# Patient Record
Sex: FEMALE | Race: BLACK OR AFRICAN AMERICAN | NOT HISPANIC OR LATINO
[De-identification: names, ages, dates, MRNs, and addresses within clinical notes are randomized per-mention and may not be internally consistent; named-entity substitution may affect disease eponyms.]

---

## 2021-05-26 ENCOUNTER — NON-APPOINTMENT (OUTPATIENT)
Age: 36
End: 2021-05-26

## 2021-06-01 ENCOUNTER — LABORATORY RESULT (OUTPATIENT)
Age: 36
End: 2021-06-01

## 2021-06-01 ENCOUNTER — APPOINTMENT (OUTPATIENT)
Dept: FAMILY MEDICINE | Facility: CLINIC | Age: 36
End: 2021-06-01
Payer: COMMERCIAL

## 2021-06-01 ENCOUNTER — NON-APPOINTMENT (OUTPATIENT)
Age: 36
End: 2021-06-01

## 2021-06-01 VITALS — HEART RATE: 80 BPM | SYSTOLIC BLOOD PRESSURE: 118 MMHG | DIASTOLIC BLOOD PRESSURE: 76 MMHG

## 2021-06-01 VITALS
OXYGEN SATURATION: 100 % | TEMPERATURE: 98.4 F | HEART RATE: 104 BPM | HEIGHT: 62 IN | SYSTOLIC BLOOD PRESSURE: 131 MMHG | DIASTOLIC BLOOD PRESSURE: 87 MMHG

## 2021-06-01 DIAGNOSIS — Z00.00 ENCOUNTER FOR GENERAL ADULT MEDICAL EXAMINATION W/OUT ABNORMAL FINDINGS: ICD-10-CM

## 2021-06-01 DIAGNOSIS — Z82.49 FAMILY HISTORY OF ISCHEMIC HEART DISEASE AND OTHER DISEASES OF THE CIRCULATORY SYSTEM: ICD-10-CM

## 2021-06-01 DIAGNOSIS — Z78.9 OTHER SPECIFIED HEALTH STATUS: ICD-10-CM

## 2021-06-01 DIAGNOSIS — Z82.3 FAMILY HISTORY OF STROKE: ICD-10-CM

## 2021-06-01 DIAGNOSIS — Z83.3 FAMILY HISTORY OF DIABETES MELLITUS: ICD-10-CM

## 2021-06-01 DIAGNOSIS — Z82.5 FAMILY HISTORY OF ASTHMA AND OTHER CHRONIC LOWER RESPIRATORY DISEASES: ICD-10-CM

## 2021-06-01 DIAGNOSIS — Z23 ENCOUNTER FOR IMMUNIZATION: ICD-10-CM

## 2021-06-01 DIAGNOSIS — Z87.09 PERSONAL HISTORY OF OTHER DISEASES OF THE RESPIRATORY SYSTEM: ICD-10-CM

## 2021-06-01 PROCEDURE — G0442 ANNUAL ALCOHOL SCREEN 15 MIN: CPT | Mod: NC,59

## 2021-06-01 PROCEDURE — 99072 ADDL SUPL MATRL&STAF TM PHE: CPT

## 2021-06-01 PROCEDURE — 36415 COLL VENOUS BLD VENIPUNCTURE: CPT

## 2021-06-01 PROCEDURE — 90471 IMMUNIZATION ADMIN: CPT

## 2021-06-01 PROCEDURE — 99385 PREV VISIT NEW AGE 18-39: CPT | Mod: 25

## 2021-06-01 PROCEDURE — 90707 MMR VACCINE SC: CPT

## 2021-06-01 NOTE — HEALTH RISK ASSESSMENT
[Good] : ~his/her~  mood as  good [] : No [Yes] : Yes [2 - 4 times a month (2 pts)] : 2-4 times a month (2 points) [1 or 2 (0 pts)] : 1 or 2 (0 points) [Never (0 pts)] : Never (0 points) [No] : In the past 12 months have you used drugs other than those required for medical reasons? No [0] : 2) Feeling down, depressed, or hopeless: Not at all (0) [Audit-CScore] : 2 [de-identified] : fruits, veggies  [de-identified] : 20 min/ day, daily  [[id number]] : 0 [Patient reported PAP Smear was normal] : Patient reported PAP Smear was normal [HIV test declined] : HIV test declined [Hepatitis C test declined] : Hepatitis C test declined [Change in mental status noted] : No change in mental status noted [Language] : denies difficulty with language [None] : None [With Family] : lives with family [Employed] : employed [Single] : single [Sexually Active] : not sexually active [High Risk Behavior] : no high risk behavior [Feels Safe at Home] : Feels safe at home [Fully functional (bathing, dressing, toileting, transferring, walking, feeding)] : Fully functional (bathing, dressing, toileting, transferring, walking, feeding) [Fully functional (using the telephone, shopping, preparing meals, housekeeping, doing laundry, using] : Fully functional and needs no help or supervision to perform IADLs (using the telephone, shopping, preparing meals, housekeeping, doing laundry, using transportation, managing medications and managing finances) [Reports changes in hearing] : Reports no changes in hearing [Reports changes in vision] : Reports no changes in vision [PapSmearDate] : 04/21 [HIVDate] : 04/21 [HepatitisCDate] : 04/21 [FreeTextEntry2] : GRANADOS

## 2021-06-01 NOTE — HISTORY OF PRESENT ILLNESS
[FreeTextEntry1] : establish care  [de-identified] : 35 yo f presents to establish care. \par Here for a physical, no complaints today, last physical was a few years ago, all was normal. \par

## 2021-06-02 ENCOUNTER — TRANSCRIPTION ENCOUNTER (OUTPATIENT)
Age: 36
End: 2021-06-02

## 2021-06-02 LAB
25(OH)D3 SERPL-MCNC: 33.6 NG/ML
ALBUMIN SERPL ELPH-MCNC: 4.2 G/DL
ALP BLD-CCNC: 41 U/L
ALT SERPL-CCNC: 6 U/L
ANION GAP SERPL CALC-SCNC: 12 MMOL/L
AST SERPL-CCNC: 16 U/L
BASOPHILS # BLD AUTO: 0.06 K/UL
BASOPHILS NFR BLD AUTO: 1.2 %
BILIRUB SERPL-MCNC: 0.5 MG/DL
BUN SERPL-MCNC: 9 MG/DL
CALCIUM SERPL-MCNC: 9.4 MG/DL
CHLORIDE SERPL-SCNC: 107 MMOL/L
CHOLEST SERPL-MCNC: 172 MG/DL
CO2 SERPL-SCNC: 20 MMOL/L
CREAT SERPL-MCNC: 0.66 MG/DL
EOSINOPHIL # BLD AUTO: 0.11 K/UL
EOSINOPHIL NFR BLD AUTO: 2.1 %
ESTIMATED AVERAGE GLUCOSE: 97 MG/DL
GLUCOSE SERPL-MCNC: 78 MG/DL
HBA1C MFR BLD HPLC: 5 %
HCT VFR BLD CALC: 27.1 %
HDLC SERPL-MCNC: 61 MG/DL
HGB BLD-MCNC: 6.5 G/DL
IMM GRANULOCYTES NFR BLD AUTO: 0.4 %
LDLC SERPL CALC-MCNC: 98 MG/DL
LYMPHOCYTES # BLD AUTO: 1.16 K/UL
LYMPHOCYTES NFR BLD AUTO: 22.3 %
MAN DIFF?: NORMAL
MCHC RBC-ENTMCNC: 16.3 PG
MCHC RBC-ENTMCNC: 24 GM/DL
MCV RBC AUTO: 68.1 FL
MONOCYTES # BLD AUTO: 0.33 K/UL
MONOCYTES NFR BLD AUTO: 6.3 %
NEUTROPHILS # BLD AUTO: 3.53 K/UL
NEUTROPHILS NFR BLD AUTO: 67.7 %
NONHDLC SERPL-MCNC: 110 MG/DL
PLATELET # BLD AUTO: 461 K/UL
POTASSIUM SERPL-SCNC: 4.7 MMOL/L
PROT SERPL-MCNC: 7.2 G/DL
RBC # BLD: 3.98 M/UL
RBC # FLD: 21.2 %
SODIUM SERPL-SCNC: 139 MMOL/L
TRIGL SERPL-MCNC: 61 MG/DL
TSH SERPL-ACNC: 1.48 UIU/ML
WBC # FLD AUTO: 5.21 K/UL

## 2021-06-03 ENCOUNTER — INPATIENT (INPATIENT)
Facility: HOSPITAL | Age: 36
LOS: 0 days | Discharge: HOME | DRG: 812 | End: 2021-06-04
Attending: STUDENT IN AN ORGANIZED HEALTH CARE EDUCATION/TRAINING PROGRAM | Admitting: STUDENT IN AN ORGANIZED HEALTH CARE EDUCATION/TRAINING PROGRAM
Payer: COMMERCIAL

## 2021-06-03 VITALS
OXYGEN SATURATION: 100 % | RESPIRATION RATE: 18 BRPM | SYSTOLIC BLOOD PRESSURE: 155 MMHG | DIASTOLIC BLOOD PRESSURE: 94 MMHG | HEART RATE: 111 BPM | HEIGHT: 62 IN | WEIGHT: 179.9 LBS | TEMPERATURE: 98 F

## 2021-06-03 DIAGNOSIS — R63.8 OTHER SYMPTOMS AND SIGNS CONCERNING FOOD AND FLUID INTAKE: ICD-10-CM

## 2021-06-03 DIAGNOSIS — D64.9 ANEMIA, UNSPECIFIED: ICD-10-CM

## 2021-06-03 DIAGNOSIS — R00.2 PALPITATIONS: ICD-10-CM

## 2021-06-03 DIAGNOSIS — D21.9 BENIGN NEOPLASM OF CONNECTIVE AND OTHER SOFT TISSUE, UNSPECIFIED: ICD-10-CM

## 2021-06-03 DIAGNOSIS — M75.100 UNSPECIFIED ROTATOR CUFF TEAR OR RUPTURE OF UNSPECIFIED SHOULDER, NOT SPECIFIED AS TRAUMATIC: ICD-10-CM

## 2021-06-03 LAB — HCG SERPL-ACNC: <0 MIU/ML — SIGNIFICANT CHANGE UP

## 2021-06-03 PROCEDURE — 71045 X-RAY EXAM CHEST 1 VIEW: CPT | Mod: 26

## 2021-06-03 PROCEDURE — 93010 ELECTROCARDIOGRAM REPORT: CPT

## 2021-06-03 PROCEDURE — 99285 EMERGENCY DEPT VISIT HI MDM: CPT | Mod: 25

## 2021-06-03 PROCEDURE — 99223 1ST HOSP IP/OBS HIGH 75: CPT | Mod: GC

## 2021-06-03 NOTE — ED PROVIDER NOTE - OBJECTIVE STATEMENT
35 y/o F pt with PMHx of fibroids presents to ED c/o lightheadedness and palpitations for the past few days. Pt went to her PMD for check up and had labs drawn; she was later told she was anemic to hemoglobin of 6.5. Pt relates having heavy periods in addition to her fibroids, but is not currently having any bleeding. She was instructed to come to ED for evaluation. Pt was found to be tachycardic in ED upon arrival, but denies active chest pain, and her other VS are stable.

## 2021-06-03 NOTE — H&P ADULT - NSHPPHYSICALEXAM_GEN_ALL_CORE
VITAL SIGNS:  T(C): 36.9 (06-03-21 @ 21:45), Max: 37.3 (06-03-21 @ 19:51)  T(F): 98.5 (06-03-21 @ 21:45), Max: 99.2 (06-03-21 @ 19:51)  HR: 91 (06-03-21 @ 21:45) (91 - 111)  BP: 128/80 (06-03-21 @ 21:45) (128/80 - 155/94)  BP(mean): --  RR: 18 (06-03-21 @ 21:45) (16 - 18)  SpO2: 100% (06-03-21 @ 21:45) (100% - 100%)  Wt(kg): --    PHYSICAL EXAM:  Constitutional: WDWN, lying comfortably in bed, NAD  Head: Nc/At  Eyes: PERRL, EOMI, +conjunctival pallor   ENT: no nasal discharge; uvula midline, no oropharyngeal erythema or exudates; MMM  Neck: supple; no JVD or thyromegaly  Respiratory: CTA b/l, no wheezes, rales, or rhonchi  Cardiac: +S1/S2, +RRR, no murmurs, rubs, or gallops  Gastrointestinal: soft, non-tender, non-distended, no rebound/guarding, no palpable masses, normoactive bowel sounds x4  Extremities: WWP, no clubbing or cyanosis, no peripheral edema  Musculoskeletal: NROM x4; no joint swelling, tenderness or erythema  Vascular: 2+ radial and DP pulses b/l  Dermatologic: skin warm, dry and intact, no rashes, wounds, or scars, +pallor in nail beds   Lymphatic: no submandibular or cervical LAD  Neurologic: AAOx3, CNII-XII grossly intact, no focal deficits  Psychiatric: affect and characteristics of appearance, verbalizations, behaviors are appropriate

## 2021-06-03 NOTE — H&P ADULT - PROBLEM SELECTOR PLAN 1
#Microcytic symptomatic anemia:   Presenting with weakness, GRANADOS, and palpitations after being found to have a Hb of 6.5 at a routine PCP visit. Hb 6.3 in the ED with MCV of 62. Patient reports significant blood loss during menstrual cycles and is concerned about the fact that she is currently anemic with her menstrual cycle approaching (last cycle was 05/05-05/15). Denies melena, hematochezia, or hematemesis.   - consented and ordered for 2U pRBCS   - f/u iron studies in AM   - maintain active type and screen and transfuse if Hb<7  - consider Venofer 200 mg IV x1 as likely CHARLIE   - discharge on ferrous sulfate PO daily   - out-pt follow-up with PCP (Dr. Lagos) and OBGYN (Dr. Chavez)

## 2021-06-03 NOTE — H&P ADULT - PROBLEM SELECTOR PLAN 3
#Palpitations:   Likely 2/2 to tachycardia in the setting of symptomatic anemia. Complains of some palpitations the past week in the absence of CP.   - EKG showing NSR   - troponin negative

## 2021-06-03 NOTE — H&P ADULT - PROBLEM SELECTOR PLAN 2
History of uterine fibroids, for which patient has undergone at least 2 myomectomies. Most recent endometrial myomectomy was in Jan 2016. Follows with a PCP in [place] (Dr. Gilberto Chavez). LMP ended 05/15.   - continue home tramadol 50 mg PO q6 PRN for pelvic pain   - out-patient follow-up with OBGYN

## 2021-06-03 NOTE — H&P ADULT - ASSESSMENT
36F with PMH of anemia 2/2 to menorrhagia from fibroids (s/p myomectomy in Jan 2016) and rotator cuff tear, who presents from out-patient PCP for symptomatic anemia. Found to have Hb of 6.3, so admitted to receive 2U pRBCS.

## 2021-06-03 NOTE — ED PROVIDER NOTE - CLINICAL SUMMARY MEDICAL DECISION MAKING FREE TEXT BOX
35 y/o F pt presents to ED with asymptomatic anemia. Will check labs, pt crossed for 2units PRBC, and will admit.

## 2021-06-03 NOTE — H&P ADULT - NSHPLABSRESULTS_GEN_ALL_CORE
LABS:                         6.3    6.70  )-----------( 491      ( 03 Jun 2021 19:26 )             24.3     06-03    144  |  110<H>  |  10  ----------------------------<  112<H>  4.0   |  23  |  0.58    Ca    9.0      03 Jun 2021 19:26    TPro  7.8  /  Alb  4.3  /  TBili  0.5  /  DBili  x   /  AST  15  /  ALT  8<L>  /  AlkPhos  42  06-03    PT/INR - ( 03 Jun 2021 19:26 )   PT: 12.4 sec;   INR: 1.04          PTT - ( 03 Jun 2021 19:26 )  PTT:29.0 sec    CARDIAC MARKERS ( 03 Jun 2021 19:26 )  x     / <0.01 ng/mL / x     / x     / x                RADIOLOGY, EKG & ADDITIONAL TESTS: Reviewed.

## 2021-06-03 NOTE — ED ADULT TRIAGE NOTE - CHIEF COMPLAINT QUOTE
Patient states, "My hemoglobin is 6.5."  Patient complaining of CP, SOB, HA and lightheadedness.  Patient denies any dizziness, N/V/D, vaginal bleeding or any other complaints at this time.  EKG in progress.

## 2021-06-03 NOTE — ED ADULT NURSE NOTE - NSIMPLEMENTINTERV_GEN_ALL_ED
Implemented All Universal Safety Interventions:  [place] to call system. Call bell, personal items and telephone within reach. Instruct patient to call for assistance. Room bathroom lighting operational. Non-slip footwear when patient is off stretcher. Physically safe environment: no spills, clutter or unnecessary equipment. Stretcher in lowest position, wheels locked, appropriate side rails in place.

## 2021-06-03 NOTE — H&P ADULT - PROBLEM SELECTOR PLAN 5
F: none   E: keep K>4, Mg>2  N: regular diet     DVT ppx: ambulatory and held in setting of anemia   GI ppx: none   Code status: full code

## 2021-06-03 NOTE — ED ADULT NURSE NOTE - OBJECTIVE STATEMENT
Patient c/o generalized weakness, sob, dizziness x 1 week, vaginal bleeding x 1 month. Hx fibroids.  Was told her hgb is 6.5.  VSS.  Labs sent.  Continue to monitor

## 2021-06-03 NOTE — H&P ADULT - HISTORY OF PRESENT ILLNESS
HPI:   36F with PMH of anemia 2/2 to menorrhagia from fibroids (s/p myomectomy in Jan 2016) and R rotator cuff tear, who presents from out-patient PCP with a Hb of 6.5. Patient said she went to see her doctor for a routine visit and was found to be anemic. Does state she has been experiencing weakness, GRANADOS, and some palpitations the past week. Last menstrual period was from 05/05-05/15 and patient says she was most concerned about the anemia in anticipation of her period coming up this month. Otherwise has been in good health and only home medication is Tramadol 50 mg PO q6 for pelvic pain. Per patient, her PCP is Dr. Bassem Lagos and she intermittently follows with an OBGYN (Dr. Gilberto Chavez) but the OBGYN lives in [place].     ED COURSE:   Vitals: temp 98.5,  --> 91, /80, RR 18 with O2 sat 100%  Labs: WBC 6.7, H/H 6.3/24.3 MCV 62.1, plt 491, coags WNL and BMP WNL with Cr of 0.58, trop negative   Imaging: chest x-ray clear; EKG showing NSR   Interventions: given 1U pRBCS    HPI:   36F with PMH of anemia 2/2 to menorrhagia from fibroids (s/p myomectomy in Jan 2016) and R rotator cuff tear, who presents from out-patient PCP with a Hb of 6.5. Patient said she went to see her doctor for a routine visit and was found to be anemic. Does state she has been experiencing weakness, GRANADOS, and some palpitations the past week. Last menstrual period was from 05/05-05/15 and patient says she was most concerned about the anemia in anticipation of her period coming up this month. Otherwise has been in good health and only home medication is Tramadol 50 mg PO q6 for pelvic pain. Per patient, her PCP is Dr. Bassem Lagos and she intermittently follows with an OBGYN (Dr. Gilberto Chavez) but the OBGYN lives in [place]. Of note, patient describes that when she receives tetracycline, she develops hives.     ED COURSE:   Vitals: temp 98.5,  --> 91, /80, RR 18 with O2 sat 100%  Labs: WBC 6.7, H/H 6.3/24.3 MCV 62.1, plt 491, coags WNL and BMP WNL with Cr of 0.58, trop negative   Imaging: chest x-ray clear; EKG showing NSR   Interventions: given 1U pRBCS    HPI:   36F with PMH of anemia 2/2 to menorrhagia from fibroids (s/p myomectomy in Jan 2016) and R rotator cuff tear, who presents from out-patient PCP with a Hb of 6.5. Patient said she went to see her doctor for a routine visit and was found to be anemic. Does state she has been experiencing weakness, GRANADOS, and some palpitations the past week. Last menstrual period was from 05/05-05/15 and patient says she was most concerned about the anemia in anticipation of her period coming up this month. Otherwise has been in good health and only home medication is Tramadol 50 mg PO q6 for pelvic pain. Currently denies nausea, vomiting, diarrhea, constipation, fever, chills, night sweats, shortness of breath, cough, wheezing, chest pain, dysuria, or increased urinary frequency. Per patient, her PCP is Dr. Bassem Lagos, and she intermittently follows with an OBGYN (Dr. Gilberto Chavez) but the OBGYN lives in [place]. Of note, patient describes that when she receives tetracycline, she develops hives.     ED COURSE:   Vitals: temp 98.5,  --> 91, /80, RR 18 with O2 sat 100%  Labs: WBC 6.7, H/H 6.3/24.3 MCV 62.1, plt 491, coags WNL and BMP WNL with Cr of 0.58, trop negative   Imaging: chest x-ray clear; EKG showing NSR   Interventions: given 1U pRBCS

## 2021-06-04 ENCOUNTER — TRANSCRIPTION ENCOUNTER (OUTPATIENT)
Age: 36
End: 2021-06-04

## 2021-06-04 VITALS
OXYGEN SATURATION: 99 % | RESPIRATION RATE: 18 BRPM | TEMPERATURE: 98 F | DIASTOLIC BLOOD PRESSURE: 78 MMHG | HEART RATE: 84 BPM | SYSTOLIC BLOOD PRESSURE: 116 MMHG

## 2021-06-04 LAB
ANION GAP SERPL CALC-SCNC: 11 MMOL/L — SIGNIFICANT CHANGE UP (ref 5–17)
BUN SERPL-MCNC: 11 MG/DL — SIGNIFICANT CHANGE UP (ref 7–23)
CALCIUM SERPL-MCNC: 8.7 MG/DL — SIGNIFICANT CHANGE UP (ref 8.4–10.5)
CHLORIDE SERPL-SCNC: 108 MMOL/L — SIGNIFICANT CHANGE UP (ref 96–108)
CO2 SERPL-SCNC: 22 MMOL/L — SIGNIFICANT CHANGE UP (ref 22–31)
COVID-19 SPIKE DOMAIN AB INTERP: POSITIVE
COVID-19 SPIKE DOMAIN ANTIBODY RESULT: 46 U/ML — HIGH
CREAT SERPL-MCNC: 0.56 MG/DL — SIGNIFICANT CHANGE UP (ref 0.5–1.3)
FERRITIN SERPL-MCNC: 3 NG/ML — LOW (ref 15–150)
GLUCOSE SERPL-MCNC: 85 MG/DL — SIGNIFICANT CHANGE UP (ref 70–99)
HCT VFR BLD CALC: 23.7 % — LOW (ref 34.5–45)
HCT VFR BLD CALC: 27.3 % — LOW (ref 34.5–45)
HGB BLD-MCNC: 6.6 G/DL — CRITICAL LOW (ref 11.5–15.5)
HGB BLD-MCNC: 7.9 G/DL — LOW (ref 11.5–15.5)
IRON SATN MFR SERPL: 12 UG/DL — LOW (ref 30–160)
IRON SATN MFR SERPL: 3 % — LOW (ref 14–50)
MAGNESIUM SERPL-MCNC: 1.9 MG/DL — SIGNIFICANT CHANGE UP (ref 1.6–2.6)
MCHC RBC-ENTMCNC: 17.9 PG — LOW (ref 27–34)
MCHC RBC-ENTMCNC: 19.3 PG — LOW (ref 27–34)
MCHC RBC-ENTMCNC: 27.8 GM/DL — LOW (ref 32–36)
MCHC RBC-ENTMCNC: 28.9 GM/DL — LOW (ref 32–36)
MCV RBC AUTO: 64.4 FL — LOW (ref 80–100)
MCV RBC AUTO: 66.7 FL — LOW (ref 80–100)
NRBC # BLD: 0 /100 WBCS — SIGNIFICANT CHANGE UP (ref 0–0)
NRBC # BLD: 0 /100 WBCS — SIGNIFICANT CHANGE UP (ref 0–0)
PHOSPHATE SERPL-MCNC: 4.3 MG/DL — SIGNIFICANT CHANGE UP (ref 2.5–4.5)
PLATELET # BLD AUTO: 421 K/UL — HIGH (ref 150–400)
PLATELET # BLD AUTO: 424 K/UL — HIGH (ref 150–400)
POTASSIUM SERPL-MCNC: 3.8 MMOL/L — SIGNIFICANT CHANGE UP (ref 3.5–5.3)
POTASSIUM SERPL-SCNC: 3.8 MMOL/L — SIGNIFICANT CHANGE UP (ref 3.5–5.3)
RBC # BLD: 3.68 M/UL — LOW (ref 3.8–5.2)
RBC # BLD: 3.68 M/UL — LOW (ref 3.8–5.2)
RBC # BLD: 4.09 M/UL — SIGNIFICANT CHANGE UP (ref 3.8–5.2)
RBC # FLD: 21.7 % — HIGH (ref 10.3–14.5)
RBC # FLD: 23.7 % — HIGH (ref 10.3–14.5)
RETICS #: 30.3 K/UL — SIGNIFICANT CHANGE UP (ref 25–125)
RETICS/RBC NFR: 0.8 % — SIGNIFICANT CHANGE UP (ref 0.5–2.5)
SARS-COV-2 IGG+IGM SERPL QL IA: 46 U/ML — HIGH
SARS-COV-2 IGG+IGM SERPL QL IA: POSITIVE
SODIUM SERPL-SCNC: 141 MMOL/L — SIGNIFICANT CHANGE UP (ref 135–145)
TIBC SERPL-MCNC: 432 UG/DL — HIGH (ref 220–430)
TRANSFERRIN SERPL-MCNC: 360 MG/DL — SIGNIFICANT CHANGE UP (ref 200–360)
UIBC SERPL-MCNC: 420 UG/DL — HIGH (ref 110–370)
WBC # BLD: 5.52 K/UL — SIGNIFICANT CHANGE UP (ref 3.8–10.5)
WBC # BLD: 5.91 K/UL — SIGNIFICANT CHANGE UP (ref 3.8–10.5)
WBC # FLD AUTO: 5.52 K/UL — SIGNIFICANT CHANGE UP (ref 3.8–10.5)
WBC # FLD AUTO: 5.91 K/UL — SIGNIFICANT CHANGE UP (ref 3.8–10.5)

## 2021-06-04 PROCEDURE — 82728 ASSAY OF FERRITIN: CPT

## 2021-06-04 PROCEDURE — 99285 EMERGENCY DEPT VISIT HI MDM: CPT | Mod: 25

## 2021-06-04 PROCEDURE — 80053 COMPREHEN METABOLIC PANEL: CPT

## 2021-06-04 PROCEDURE — 71045 X-RAY EXAM CHEST 1 VIEW: CPT

## 2021-06-04 PROCEDURE — 86901 BLOOD TYPING SEROLOGIC RH(D): CPT

## 2021-06-04 PROCEDURE — 83550 IRON BINDING TEST: CPT

## 2021-06-04 PROCEDURE — 85025 COMPLETE CBC W/AUTO DIFF WBC: CPT

## 2021-06-04 PROCEDURE — U0003: CPT

## 2021-06-04 PROCEDURE — 85610 PROTHROMBIN TIME: CPT

## 2021-06-04 PROCEDURE — 93005 ELECTROCARDIOGRAM TRACING: CPT

## 2021-06-04 PROCEDURE — 36430 TRANSFUSION BLD/BLD COMPNT: CPT

## 2021-06-04 PROCEDURE — P9016: CPT

## 2021-06-04 PROCEDURE — 86769 SARS-COV-2 COVID-19 ANTIBODY: CPT

## 2021-06-04 PROCEDURE — 84100 ASSAY OF PHOSPHORUS: CPT

## 2021-06-04 PROCEDURE — U0005: CPT

## 2021-06-04 PROCEDURE — 86923 COMPATIBILITY TEST ELECTRIC: CPT

## 2021-06-04 PROCEDURE — 85730 THROMBOPLASTIN TIME PARTIAL: CPT

## 2021-06-04 PROCEDURE — 83540 ASSAY OF IRON: CPT

## 2021-06-04 PROCEDURE — 86850 RBC ANTIBODY SCREEN: CPT

## 2021-06-04 PROCEDURE — 84484 ASSAY OF TROPONIN QUANT: CPT

## 2021-06-04 PROCEDURE — 86900 BLOOD TYPING SEROLOGIC ABO: CPT

## 2021-06-04 PROCEDURE — 99239 HOSP IP/OBS DSCHRG MGMT >30: CPT | Mod: GC

## 2021-06-04 PROCEDURE — 83735 ASSAY OF MAGNESIUM: CPT

## 2021-06-04 PROCEDURE — 85045 AUTOMATED RETICULOCYTE COUNT: CPT

## 2021-06-04 PROCEDURE — 36415 COLL VENOUS BLD VENIPUNCTURE: CPT

## 2021-06-04 PROCEDURE — 85027 COMPLETE CBC AUTOMATED: CPT

## 2021-06-04 PROCEDURE — 84466 ASSAY OF TRANSFERRIN: CPT

## 2021-06-04 PROCEDURE — 84702 CHORIONIC GONADOTROPIN TEST: CPT

## 2021-06-04 PROCEDURE — 80048 BASIC METABOLIC PNL TOTAL CA: CPT

## 2021-06-04 RX ORDER — FERROUS SULFATE 325(65) MG
1 TABLET ORAL
Qty: 60 | Refills: 0
Start: 2021-06-04

## 2021-06-04 RX ORDER — DIPHENHYDRAMINE HCL 50 MG
50 CAPSULE ORAL ONCE
Refills: 0 | Status: COMPLETED | OUTPATIENT
Start: 2021-06-04 | End: 2021-06-04

## 2021-06-04 RX ADMIN — Medication 50 MILLIGRAM(S): at 00:10

## 2021-06-04 NOTE — DISCHARGE NOTE NURSING/CASE MANAGEMENT/SOCIAL WORK - PATIENT PORTAL LINK FT
You can access the FollowMyHealth Patient Portal offered by [facility] by registering at the following website: [email or web address]. By joining [facility]’s FollowMyHealth portal, you will also be able to view your health information using other applications (apps) compatible with our system.

## 2021-06-04 NOTE — DISCHARGE NOTE PROVIDER - NSDCCPCAREPLAN_GEN_ALL_CORE_FT
PRINCIPAL DISCHARGE DIAGNOSIS  Diagnosis: Symptomatic anemia  Assessment and Plan of Treatment: Iron deficiency anemia (CHARLIE) means you have low red blood cell and hemoglobin levels. Hemoglobin is part of red blood cells and helps carry oxygen to your body. Iron helps make hemoglobin. CHARLIE is caused by a lack of iron in the blood. Blood loss and not enough iron in the foods you eat are the most common causes of low iron.  Seek care immediately if:   •You have dark or bloody bowel movements.  •You vomit blood.  •You are too dizzy to stand up.  •You have trouble swallowing because of the pain in your mouth and throat.  Call your doctor if:   •You have heartburn, constipation, or diarrhea.  •You have nausea or are vomiting.  •You are dizzy or very tired.  •You have questions or concerns about your condition or care.  Treatment for CHARLIE may take 3 to 6 months. You may need medicines and supplements to increase the amount of iron in your blood. Ask your healthcare provider how much iron you should take each day. A blood transfusion may be needed if your anemia is severe. This will help replace the blood and iron you have lost.  Eat foods rich in iron and protein: Nuts, meat, dark leafy green vegetables, and beans are high in iron and protein. Limit milk to 2 cups a day. The calcium in milk can interfere with how your body absorbs iron. Take the iron supplement with food or a drink that is high in vitamin C. This helps your body absorb the iron. You may need to meet with a dietitian to create the right food plan for you.

## 2021-06-04 NOTE — DISCHARGE NOTE PROVIDER - NSDCMRMEDTOKEN_GEN_ALL_CORE_FT
traMADol 50 mg oral tablet: 1 tab(s) orally every 6 hours   ferrous sulfate 325 mg (65 mg elemental iron) oral tablet: 1 tab(s) orally once a day   traMADol 50 mg oral tablet: 1 tab(s) orally every 6 hours

## 2021-06-04 NOTE — PROGRESS NOTE ADULT - PROBLEM SELECTOR PLAN 3
#Palpitations:   Likely 2/2 to tachycardia in the setting of symptomatic anemia. Complains of some palpitations the past week in the absence of CP.   - EKG showing NSR   - troponin negative Likely 2/2 to tachycardia in the setting of symptomatic anemia. Complains of some palpitations the past week in the absence of CP.   - EKG showing NSR   - troponin negative

## 2021-06-04 NOTE — ED ADULT NURSE REASSESSMENT NOTE - NS ED NURSE REASSESS COMMENT FT1
endorsed to Olivia Wang RN about the pt reactions from blood transfusions, and to be resumed BT 15 minutes after the benadrly po.

## 2021-06-04 NOTE — PATIENT PROFILE ADULT - NSPROPTRIGHTCAREGIVER_GEN_A_NUR
Orders received, chart reviewed, Patient observed up independently in room, Patient reports no current concerns with ADLs or functional mobility. RN also voicing no concerns stating Patient has been doing well. No current acute therapy needs at this time. OT signing off, PT aware.    declines

## 2021-06-04 NOTE — PROGRESS NOTE ADULT - PROBLEM SELECTOR PLAN 1
iron deficiency anemia d/t chronic GYN blood loss, d/t reported fibroids; Hb improved and sx resolved s/p PRBC transfusion x2; Pt. verbalized plan to f/u w/ GYN for fibroid mgmt; will start oral iron supplementation; monitor CBC

## 2021-06-04 NOTE — PROGRESS NOTE ADULT - ASSESSMENT
37 y/o F w/
36F with PMH of anemia 2/2 to menorrhagia from fibroids (s/p myomectomy in Jan 2016) and rotator cuff tear, who presents from out-patient PCP for symptomatic anemia. Found to have Hb of 6.3, so admitted to receive 2U pRBCS.

## 2021-06-04 NOTE — DISCHARGE NOTE PROVIDER - NSDCFUADDAPPT_GEN_ALL_CORE_FT
Please make an appointment for OBGYN follow up at the [facility] OBGYN clinic: [phone]

## 2021-06-04 NOTE — DISCHARGE NOTE PROVIDER - HOSPITAL COURSE
36F with PMH of anemia 2/2 to menorrhagia from fibroids (s/p myomectomy in Jan 2016) and R rotator cuff tear, who presents from out-patient PCP with a Hb of 6.5. Patient said she went to see her doctor for a routine visit and was found to be anemic. Does state she has been experiencing weakness, GRANADOS, and some palpitations the past week. Last menstrual period was from 05/05-05/15, and needs to change her pad every 30-45minutes. No changes to her period pattern. Otherwise has been in good health and only home medication is Tramadol 50 mg PO q6 for pelvic pain, and takes iron on and off. Denies melena, hematochezia, hemoptysis. Denies nausea, vomiting, diarrhea, constipation, fever, chills, night sweats, shortness of breath, cough, wheezing, chest pain, dysuria, or increased urinary frequency. States has a history of nosebleeds, about once a week, but this past year has been less. Denies family history of easy bleeding. The patient received 2U pRBC. The patient experienced mild itching during the first transfusion, the transfusion was stopped for 20minutes, the patient was given 50mg PO diphenhydramine, and the transfusion was continued without complication. No complications during the second transfusion. After the transfusion the patient reports feeling fatigued but overall ok. No chest pain, SOB, dizziness.   Vitals  T 36.7  HR 86  /62  RR 18  O2 99%    Physical exam  Constitutional: lying comfortably in bed, NAD  Head: NC/AT  Eyes: EOMI, +conjunctival pallor   ENT: no nasal discharge; uvula midline, no oropharyngeal erythema or exudates; MMM  Neck: supple; no JVD or thyromegaly  Respiratory: CTA b/l, no wheezes, rales, or rhonchi  Cardiac: +S1/S2, +RRR, no murmurs, rubs, or gallops  Gastrointestinal: soft, non-tender, non-distended, no rebound/guarding, no palpable masses  Extremities: no clubbing or cyanosis, no peripheral edema, normal capillary refill  Musculoskeletal: NROM x4; no joint swelling, tenderness or erythema, 5/5 upper and lower extremity strength  Vascular: 2+ radial and DP pulses b/l  Dermatologic: skin warm, dry and intact, no rashes, wounds, or scars, +pallor in nail beds   Lymphatic: no submandibular or cervical LAD  Neurologic: AAOx3, CNII-XII grossly intact, no focal deficits  Psychiatric: affect and characteristics of appearance, verbalizations, behaviors are appropriate    Assessment   36F with PMH of anemia 2/2 to menorrhagia from fibroids (s/p myomectomy in Jan 2016) and rotator cuff tear, who presents from out-patient PCP for symptomatic anemia. Found to have Hb of 6.3, so admitted to receive 2U pRBCS.      Problem 1: Symptomatic anemia   Presenting with weakness, GRANADOS, and palpitations after being found to have a Hb of 6.5 at a routine PCP visit. Hb 6.3 in the ED with MCV of 62. Patient reports significant blood loss during menstrual cycles (last cycle was 05/05-05/15). Denies melena, hematochezia, or hematemesis.   - consented and ordered for 2U pRBCS   - discharge on ferrous sulfate PO daily   - out-pt follow-up with OBGYN     Problem 2: Fibroids  History of uterine fibroids, for which patient has undergone at least 2 myomectomies. Most recent endometrial myomectomy was in Jan 2016. Follows with a PCP in [place] (Dr. Gilberto Chavez). LMP ended 05/15.   - continue home tramadol 50 mg PO q6 PRN for pelvic pain   - out-patient follow-up with OBGYN.      Problem 3: Palpitations  Likely 2/2 to tachycardia in the setting of symptomatic anemia. Complains of some palpitations the past week in the absence of CP.   - EKG showing NSR   - troponin negative.     Problem/Plan - 4:  ·  Problem: Rotator cuff tear.  Plan: #R Rotator cuff tear:   - no active management at this time   - out-patient follow-up with PCP.      Problem/Plan - 5:  ·  Problem: Nutrition, metabolism, and development symptoms.  Plan: F: none   E: keep K>4, Mg>2  N: regular diet     DVT ppx: ambulatory and held in setting of anemia   GI ppx: none   Code status: full code.    36F with PMH of anemia 2/2 to menorrhagia from fibroids (s/p myomectomy in Jan 2016) and R rotator cuff tear, who presents from out-patient PCP with a Hb of 6.5. Patient said she went to see her doctor for a routine visit and was found to be anemic. Does state she has been experiencing weakness, GRANADOS, and some palpitations the past week. Last menstrual period was from 05/05-05/15, and needs to change her pad every 30-45minutes. No changes to her period pattern. Otherwise has been in good health and only home medication is Tramadol 50 mg PO q6 for pelvic pain, and takes iron on and off. Denies melena, hematochezia, hemoptysis. Denies nausea, vomiting, diarrhea, constipation, fever, chills, night sweats, shortness of breath, cough, wheezing, chest pain, dysuria, or increased urinary frequency. States has a history of nosebleeds, about once a week, but this past year has been less. Denies family history of easy bleeding. The patient received 2U pRBC. The patient experienced mild itching during the first transfusion, the transfusion was stopped for 20minutes, the patient was given 50mg PO diphenhydramine, and the transfusion was continued without complication. No complications during the second transfusion. After the transfusion the patient reports feeling fatigued but overall ok. No chest pain, SOB, dizziness.    Physical exam  Constitutional: lying comfortably in bed, NAD  Head: NC/AT  Eyes: EOMI, +conjunctival pallor   ENT: no nasal discharge; uvula midline, no oropharyngeal erythema or exudates; MMM  Neck: supple; no JVD or thyromegaly  Respiratory: CTA b/l, no wheezes, rales, or rhonchi  Cardiac: +S1/S2, +RRR, no murmurs, rubs, or gallops  Gastrointestinal: soft, non-tender, non-distended, no rebound/guarding, no palpable masses  Extremities: no clubbing or cyanosis, no peripheral edema, normal capillary refill  Musculoskeletal: NROM x4; no joint swelling, tenderness or erythema, 5/5 upper and lower extremity strength  Vascular: 2+ radial and DP pulses b/l  Dermatologic: skin warm, dry and intact, no rashes, wounds, or scars, +pallor in nail beds   Lymphatic: no submandibular or cervical LAD  Neurologic: AAOx3, CNII-XII grossly intact, no focal deficits  Psychiatric: affect and characteristics of appearance, verbalizations, behaviors are appropriate    Assessment   36F with PMH of anemia 2/2 to menorrhagia from fibroids (s/p myomectomy in Jan 2016) and rotator cuff tear, who presents from out-patient PCP for symptomatic anemia. Found to have Hb of 6.3, so admitted to receive 2U pRBCS.      Problem 1: Symptomatic anemia   Presenting with weakness, GRANADOS, and palpitations after being found to have a Hb of 6.5 at a routine PCP visit. Hb 6.3 in the ED with MCV of 62. Patient reports significant blood loss during menstrual cycles (last cycle was 05/05-05/15). Denies melena, hematochezia, or hematemesis.   - consented and ordered for 2U pRBCS   - discharge on ferrous sulfate PO daily   - out-pt follow-up with OBGYN     Problem 2: Fibroids  History of uterine fibroids, for which patient has undergone at least 2 myomectomies. Most recent endometrial myomectomy was in Jan 2016. Follows with a PCP in [place] (Dr. Gilberto Chavez). LMP ended 05/15.   - continue home tramadol 50 mg PO q6 PRN for pelvic pain   - out-patient follow-up with OBGYN.      Problem 3: Palpitations  Likely 2/2 to tachycardia in the setting of symptomatic anemia. Complains of some palpitations the past week in the absence of CP.   - EKG showing NSR   - troponin negative.     Problem 4: Rotator cuff tear.   - no active management at this time   - out-patient follow-up with PCP    DVT ppx: ambulatory and held in setting of anemia   GI ppx: none   Code status: full code     36F with PMH of anemia 2/2 to menorrhagia from fibroids (s/p myomectomy in Jan 2016) and R rotator cuff tear, who presents from out-patient PCP with a Hb of 6.5 and fatigue, admitted for symptomatic anemia requiring PRBC transfusion. Patient got 2 units of PRBC, Hb increased to 7.9. Labs remarkable for severe iron deficiency anemia with ferritin of 3. CHARLIE 2/2 menorrhagia. Menstrual cycle is now done, patient is hemodynamically table and ready for discharge to home for GYN follow up, will send home on PO iron.       Problem List/Main Diagnoses (system-based):     Problem 1: Symptomatic anemia   Presenting with weakness, GRANADOS, and palpitations after being found to have a Hb of 6.5 at a routine PCP visit. Hb 6.3 in the ED with MCV of 62. Patient reports significant blood loss during menstrual cycles (last cycle was 05/05-05/15). Denies melena, hematochezia, or hematemesis.   - consented and ordered for 2U pRBCS   - discharge on ferrous sulfate PO daily   - out-pt follow-up with OBGYN     Problem 2: Fibroids  History of uterine fibroids, for which patient has undergone at least 2 myomectomies. Most recent endometrial myomectomy was in Jan 2016. Follows with a PCP in [place] (Dr. Gilberto Chavez). LMP ended 05/15.   - continue home tramadol 50 mg PO q6 PRN for pelvic pain   - out-patient follow-up with OBGYN.      Problem 3: Palpitations  Likely 2/2 to tachycardia in the setting of symptomatic anemia. Complains of some palpitations the past week in the absence of CP.   - EKG showing NSR   - troponin negative.     Problem 4: Rotator cuff tear.   - no active management at this time   - out-patient follow-up with PCP        New medications:  Ferrous sulfate    Labs to be followed outpatient: CBC (Hb)     Exam to be followed outpatient: GYN

## 2021-06-04 NOTE — DISCHARGE NOTE NURSING/CASE MANAGEMENT/SOCIAL WORK - NSDCFUADDAPPT_GEN_ALL_CORE_FT
Please make an appointment for OBGYN follow up at the [facility] OBGYN clinic: [phone]

## 2021-06-04 NOTE — PROGRESS NOTE ADULT - PROBLEM SELECTOR PLAN 1
#Microcytic symptomatic anemia:   Presenting with weakness, GRANADOS, and palpitations after being found to have a Hb of 6.5 at a routine PCP visit. Hb 6.3 in the ED with MCV of 62. Patient reports significant blood loss during menstrual cycles and is concerned about the fact that she is currently anemic with her menstrual cycle approaching (last cycle was 05/05-05/15). Denies melena, hematochezia, or hematemesis.   - consented and ordered for 2U pRBCS   - f/u iron studies in AM   - maintain active type and screen and transfuse if Hb<7  - consider Venofer 200 mg IV x1 as likely CHARLIE   - discharge on ferrous sulfate PO daily   - out-pt follow-up with PCP (Dr. Lagos) and OBGYN (Dr. Chavez) #Microcytic symptomatic anemia:   Presenting with weakness, GRANADOS, and palpitations after being found to have a Hb of 6.5 at a routine PCP visit. Hb 6.3 in the ED with MCV of 62. Patient reports significant blood loss during menstrual cycles (last cycle was 05/05-05/15). Denies melena, hematochezia, or hematemesis.   - consented and ordered for 2U pRBCS   - f/u post transfusion labs  - maintain active type and screen and transfuse if Hb<7  - consider Venofer 200 mg IV x1 as likely CHARLIE   - discharge on ferrous sulfate PO daily   - out-pt follow-up with PCP (Dr. Lagos) and OBGYN (Dr. Chavez)

## 2021-06-04 NOTE — PROGRESS NOTE ADULT - SUBJECTIVE AND OBJECTIVE BOX
Patient is a 36y old  Female who presents with a chief complaint of CC: symptomatic anemia (04 Jun 2021 12:35)      INTERVAL HPI/OVERNIGHT EVENTS:    Pt. seen and examined earlier today  Pt. reports increased energy s/p PRBC transfusion  Pt. denies F/C, CP, SOB, lightheadedness  Pt. not actively menstruating  Pt. reports having had an MRI in November that showed fibroids and being told by a gynecologist that she needs surgery  Pt. verbalized plan to f/u w/ GYN    Review of Systems: 12 point review of systems otherwise negative    MEDICATIONS  (STANDING):    MEDICATIONS  (PRN):      Allergies    apple (Unknown)  Bananas (Unknown)  Kiwi (Unknown)  peanuts (Unknown)  tetracycline (Unknown)    Intolerances          Vital Signs Last 24 Hrs  T(C): 36.7 (04 Jun 2021 10:20), Max: 37.3 (03 Jun 2021 19:51)  T(F): 98.1 (04 Jun 2021 10:20), Max: 99.2 (03 Jun 2021 19:51)  HR: 84 (04 Jun 2021 10:20) (84 - 96)  BP: 116/78 (04 Jun 2021 10:20) (101/62 - 142/81)  BP(mean): --  RR: 18 (04 Jun 2021 10:20) (16 - 18)  SpO2: 99% (04 Jun 2021 10:20) (99% - 100%)  CAPILLARY BLOOD GLUCOSE            Physical Exam:  (earlier today)  Daily     Daily   General:  well-appearing in NAD  HEENT:  anicteric, no pallor  CV:  no JVD  Skin:  WWP  Neuro:  AAOx3    LABS:                        7.9    5.52  )-----------( 421      ( 04 Jun 2021 12:22 )             27.3     06-04    141  |  108  |  11  ----------------------------<  85  3.8   |  22  |  0.56    Ca    8.7      04 Jun 2021 06:08  Phos  4.3     06-04  Mg     1.9     06-04    TPro  7.8  /  Alb  4.3  /  TBili  0.5  /  DBili  x   /  AST  15  /  ALT  8<L>  /  AlkPhos  42  06-03    PT/INR - ( 03 Jun 2021 19:26 )   PT: 12.4 sec;   INR: 1.04          PTT - ( 03 Jun 2021 19:26 )  PTT:29.0 sec

## 2021-06-04 NOTE — PROGRESS NOTE ADULT - SUBJECTIVE AND OBJECTIVE BOX
36F with PMH of anemia 2/2 to menorrhagia from fibroids (s/p myomectomy in Jan 2016) and R rotator cuff tear, who presents from out-patient PCP with a Hb of 6.5. Patient said she went to see her doctor for a routine visit and was found to be anemic. Does state she has been experiencing weakness, GRANADOS, and some palpitations the past week. Last menstrual period was from 05/05-05/15, and needs to change her pad every 30-45minutes. Otherwise has been in good health and only home medication is Tramadol 50 mg PO q6 for pelvic pain, and takes iron on and off. Currently denies nausea, vomiting, diarrhea, constipation, fever, chills, night sweats, shortness of breath, cough, wheezing, chest pain, dysuria, or increased urinary frequency. States has a history of nosebleeds, about once a week, but this past year has been less. Denies family history of easy bleeding.     This morning the patient states she feels fatigued but overall ok. No chest pain, SOB, dizziness.     Vitals  T 36.7  HR 86  /62  RR 18  O2 99%    Physical exam  Constitutional: lying comfortably in bed, NAD  Head: NC/AT  Eyes: EOMI, +conjunctival pallor   ENT: no nasal discharge; uvula midline, no oropharyngeal erythema or exudates; MMM  Neck: supple; no JVD or thyromegaly  Respiratory: CTA b/l, no wheezes, rales, or rhonchi  Cardiac: +S1/S2, +RRR, no murmurs, rubs, or gallops  Gastrointestinal: soft, non-tender, non-distended, no rebound/guarding, no palpable masses  Extremities: no clubbing or cyanosis, no peripheral edema, normal capillary refill  Musculoskeletal: NROM x4; no joint swelling, tenderness or erythema  Vascular: 2+ radial and DP pulses b/l  Dermatologic: skin warm, dry and intact, no rashes, wounds, or scars, +pallor in nail beds   Lymphatic: no submandibular or cervical LAD  Neurologic: AAOx3, CNII-XII grossly intact, no focal deficits  Psychiatric: affect and characteristics of appearance, verbalizations, behaviors are appropriate 36F with PMH of anemia 2/2 to menorrhagia from fibroids (s/p myomectomy in Jan 2016) and R rotator cuff tear, who presents from out-patient PCP with a Hb of 6.5. Patient said she went to see her doctor for a routine visit and was found to be anemic. Does state she has been experiencing weakness, GRANADOS, and some palpitations the past week. Last menstrual period was from 05/05-05/15, and needs to change her pad every 30-45minutes. Otherwise has been in good health and only home medication is Tramadol 50 mg PO q6 for pelvic pain, and takes iron on and off. Currently denies nausea, vomiting, diarrhea, constipation, fever, chills, night sweats, shortness of breath, cough, wheezing, chest pain, dysuria, or increased urinary frequency. States has a history of nosebleeds, about once a week, but this past year has been less. Denies family history of easy bleeding.     This morning the patient states she feels fatigued but overall ok. No chest pain, SOB, dizziness.     Vitals  T 36.7  HR 86  /62  RR 18  O2 99%    Physical exam  Constitutional: lying comfortably in bed, NAD  Head: NC/AT  Eyes: EOMI, +conjunctival pallor   ENT: no nasal discharge; uvula midline, no oropharyngeal erythema or exudates; MMM  Neck: supple; no JVD or thyromegaly  Respiratory: CTA b/l, no wheezes, rales, or rhonchi  Cardiac: +S1/S2, +RRR, no murmurs, rubs, or gallops  Gastrointestinal: soft, non-tender, non-distended, no rebound/guarding, no palpable masses  Extremities: no clubbing or cyanosis, no peripheral edema, normal capillary refill  Musculoskeletal: NROM x4; no joint swelling, tenderness or erythema, 5/5 upper and lower extremity strength  Vascular: 2+ radial and DP pulses b/l  Dermatologic: skin warm, dry and intact, no rashes, wounds, or scars, +pallor in nail beds   Lymphatic: no submandibular or cervical LAD  Neurologic: AAOx3, CNII-XII grossly intact, no focal deficits  Psychiatric: affect and characteristics of appearance, verbalizations, behaviors are appropriate

## 2021-06-09 ENCOUNTER — NON-APPOINTMENT (OUTPATIENT)
Age: 36
End: 2021-06-09

## 2021-06-12 DIAGNOSIS — D50.0 IRON DEFICIENCY ANEMIA SECONDARY TO BLOOD LOSS (CHRONIC): ICD-10-CM

## 2021-06-12 DIAGNOSIS — Z88.1 ALLERGY STATUS TO OTHER ANTIBIOTIC AGENTS STATUS: ICD-10-CM

## 2021-06-12 DIAGNOSIS — Z91.010 ALLERGY TO PEANUTS: ICD-10-CM

## 2021-06-12 DIAGNOSIS — D25.9 LEIOMYOMA OF UTERUS, UNSPECIFIED: ICD-10-CM

## 2021-06-12 DIAGNOSIS — N92.0 EXCESSIVE AND FREQUENT MENSTRUATION WITH REGULAR CYCLE: ICD-10-CM

## 2021-06-12 DIAGNOSIS — D50.9 IRON DEFICIENCY ANEMIA, UNSPECIFIED: ICD-10-CM

## 2021-06-12 DIAGNOSIS — R00.0 TACHYCARDIA, UNSPECIFIED: ICD-10-CM

## 2021-06-12 DIAGNOSIS — Z91.018 ALLERGY TO OTHER FOODS: ICD-10-CM

## 2021-06-12 DIAGNOSIS — M75.101 UNSPECIFIED ROTATOR CUFF TEAR OR RUPTURE OF RIGHT SHOULDER, NOT SPECIFIED AS TRAUMATIC: ICD-10-CM

## 2021-06-14 ENCOUNTER — APPOINTMENT (OUTPATIENT)
Dept: FAMILY MEDICINE | Facility: CLINIC | Age: 36
End: 2021-06-14
Payer: COMMERCIAL

## 2021-06-14 VITALS
HEIGHT: 62 IN | HEART RATE: 96 BPM | OXYGEN SATURATION: 99 % | DIASTOLIC BLOOD PRESSURE: 84 MMHG | SYSTOLIC BLOOD PRESSURE: 131 MMHG | TEMPERATURE: 98.2 F

## 2021-06-14 PROCEDURE — 99496 TRANSJ CARE MGMT HIGH F2F 7D: CPT | Mod: 25

## 2021-06-14 PROCEDURE — 99213 OFFICE O/P EST LOW 20 MIN: CPT | Mod: 25

## 2021-06-14 PROCEDURE — 99072 ADDL SUPL MATRL&STAF TM PHE: CPT

## 2021-06-24 NOTE — HISTORY OF PRESENT ILLNESS
[FreeTextEntry8] : cc: s/p hospitalization \par \par 37 yo f presents s/p hospital dc. Was told she was anemic. Received 2 units. Was started in iron. \par Appt. with heme pending, gyn pending as well in a few weeks. \par No bleeding. \par

## 2021-08-12 ENCOUNTER — APPOINTMENT (OUTPATIENT)
Dept: HEMATOLOGY ONCOLOGY | Facility: CLINIC | Age: 36
End: 2021-08-12
Payer: COMMERCIAL

## 2021-08-12 PROCEDURE — 99204 OFFICE O/P NEW MOD 45 MIN: CPT | Mod: 95

## 2021-08-14 ENCOUNTER — LABORATORY RESULT (OUTPATIENT)
Age: 36
End: 2021-08-14

## 2021-08-17 LAB
APTT BLD: 31.2 SEC
BASOPHILS # BLD AUTO: 0.04 K/UL
BASOPHILS NFR BLD AUTO: 0.7 %
EOSINOPHIL # BLD AUTO: 0.12 K/UL
EOSINOPHIL NFR BLD AUTO: 2.2 %
ERYTHROCYTE [SEDIMENTATION RATE] IN BLOOD BY WESTERGREN METHOD: 25 MM/HR
FERRITIN SERPL-MCNC: 24 NG/ML
HAPTOGLOB SERPL-MCNC: 122 MG/DL
HCT VFR BLD CALC: 41.5 %
HGB BLD-MCNC: 12.4 G/DL
IMM GRANULOCYTES NFR BLD AUTO: 0.2 %
INR PPP: 1.03 RATIO
IRON SATN MFR SERPL: 12 %
IRON SERPL-MCNC: 39 UG/DL
LDH SERPL-CCNC: 186 U/L
LYMPHOCYTES # BLD AUTO: 1.58 K/UL
LYMPHOCYTES NFR BLD AUTO: 28.7 %
MAN DIFF?: NORMAL
MCHC RBC-ENTMCNC: 25.7 PG
MCHC RBC-ENTMCNC: 29.9 GM/DL
MCV RBC AUTO: 86.1 FL
MONOCYTES # BLD AUTO: 0.36 K/UL
MONOCYTES NFR BLD AUTO: 6.5 %
NEUTROPHILS # BLD AUTO: 3.39 K/UL
NEUTROPHILS NFR BLD AUTO: 61.7 %
PLATELET # BLD AUTO: 272 K/UL
PT BLD: 12.1 SEC
RBC # BLD: 4.82 M/UL
RBC # FLD: 21.6 %
TIBC SERPL-MCNC: 334 UG/DL
UIBC SERPL-MCNC: 294 UG/DL
WBC # FLD AUTO: 5.5 K/UL

## 2021-08-17 NOTE — ASSESSMENT
[FreeTextEntry1] : Discussed with patient her most recent results...\par \par Patient was directed to our laboratory on [address] to have repeat blood work for CBC as well as coagulation test..\par \par And all repeat blood work within normal limits!\par \par Consultation report to Dr. Lagos

## 2021-08-17 NOTE — CONSULT LETTER
[Dear  ___] : Dear  [unfilled], [Please see my note below.] : Please see my note below. [Consult Closing:] : Thank you very much for allowing me to participate in the care of this patient.  If you have any questions, please do not hesitate to contact me. [Sincerely,] : Sincerely, [FreeTextEntry3] : Sejal Pelayo MD\par

## 2021-08-17 NOTE — HISTORY OF PRESENT ILLNESS
[de-identified] : 36-year-old old female history of fibroids, was admitted to [facility]placed by [facility] recently with 6.5 g of hemoglobin... She was transfused and discharged on p.o. iron... Her most recent blood work from the beginning of July hemoglobin was 10.2 g/dL... Patient denies other history of bleeding with dental procedures, for myomectomy in the past, etc.... She is planned to undergo endoscopic myomectomies in the beginning of September...

## 2021-08-30 ENCOUNTER — OUTPATIENT (OUTPATIENT)
Dept: OUTPATIENT SERVICES | Facility: HOSPITAL | Age: 36
LOS: 1 days | End: 2021-08-30
Payer: COMMERCIAL

## 2021-08-30 DIAGNOSIS — Z01.818 ENCOUNTER FOR OTHER PREPROCEDURAL EXAMINATION: ICD-10-CM

## 2021-08-30 LAB
ANION GAP SERPL CALC-SCNC: 8 MMOL/L — SIGNIFICANT CHANGE UP (ref 5–17)
BLD GP AB SCN SERPL QL: NEGATIVE — SIGNIFICANT CHANGE UP
BUN SERPL-MCNC: 6 MG/DL — LOW (ref 7–23)
CALCIUM SERPL-MCNC: 9.7 MG/DL — SIGNIFICANT CHANGE UP (ref 8.4–10.5)
CHLORIDE SERPL-SCNC: 105 MMOL/L — SIGNIFICANT CHANGE UP (ref 96–108)
CO2 SERPL-SCNC: 25 MMOL/L — SIGNIFICANT CHANGE UP (ref 22–31)
CREAT SERPL-MCNC: 0.7 MG/DL — SIGNIFICANT CHANGE UP (ref 0.5–1.3)
GLUCOSE SERPL-MCNC: 70 MG/DL — SIGNIFICANT CHANGE UP (ref 70–99)
HCT VFR BLD CALC: 39 % — SIGNIFICANT CHANGE UP (ref 34.5–45)
HGB BLD-MCNC: 11.8 G/DL — SIGNIFICANT CHANGE UP (ref 11.5–15.5)
MCHC RBC-ENTMCNC: 26.8 PG — LOW (ref 27–34)
MCHC RBC-ENTMCNC: 30.3 GM/DL — LOW (ref 32–36)
MCV RBC AUTO: 88.6 FL — SIGNIFICANT CHANGE UP (ref 80–100)
NRBC # BLD: 0 /100 WBCS — SIGNIFICANT CHANGE UP (ref 0–0)
PLATELET # BLD AUTO: 238 K/UL — SIGNIFICANT CHANGE UP (ref 150–400)
POTASSIUM SERPL-MCNC: 4.8 MMOL/L — SIGNIFICANT CHANGE UP (ref 3.5–5.3)
POTASSIUM SERPL-SCNC: 4.8 MMOL/L — SIGNIFICANT CHANGE UP (ref 3.5–5.3)
RBC # BLD: 4.4 M/UL — SIGNIFICANT CHANGE UP (ref 3.8–5.2)
RBC # FLD: 17.1 % — HIGH (ref 10.3–14.5)
RH IG SCN BLD-IMP: POSITIVE — SIGNIFICANT CHANGE UP
SODIUM SERPL-SCNC: 138 MMOL/L — SIGNIFICANT CHANGE UP (ref 135–145)
WBC # BLD: 6.45 K/UL — SIGNIFICANT CHANGE UP (ref 3.8–10.5)
WBC # FLD AUTO: 6.45 K/UL — SIGNIFICANT CHANGE UP (ref 3.8–10.5)

## 2021-08-30 PROCEDURE — 80048 BASIC METABOLIC PNL TOTAL CA: CPT

## 2021-08-30 PROCEDURE — 86850 RBC ANTIBODY SCREEN: CPT

## 2021-08-30 PROCEDURE — 85027 COMPLETE CBC AUTOMATED: CPT

## 2021-08-30 PROCEDURE — 86900 BLOOD TYPING SEROLOGIC ABO: CPT

## 2021-08-30 PROCEDURE — 86901 BLOOD TYPING SEROLOGIC RH(D): CPT

## 2021-09-01 ENCOUNTER — TRANSCRIPTION ENCOUNTER (OUTPATIENT)
Age: 36
End: 2021-09-01

## 2021-09-02 ENCOUNTER — RESULT REVIEW (OUTPATIENT)
Age: 36
End: 2021-09-02

## 2021-09-02 ENCOUNTER — OUTPATIENT (OUTPATIENT)
Dept: OUTPATIENT SERVICES | Facility: HOSPITAL | Age: 36
LOS: 1 days | Discharge: HOME | End: 2021-09-02
Payer: COMMERCIAL

## 2021-09-02 PROCEDURE — 88305 TISSUE EXAM BY PATHOLOGIST: CPT | Mod: 26

## 2021-09-09 LAB — SURGICAL PATHOLOGY STUDY: SIGNIFICANT CHANGE UP

## 2022-01-31 ENCOUNTER — LABORATORY RESULT (OUTPATIENT)
Age: 37
End: 2022-01-31

## 2022-01-31 ENCOUNTER — APPOINTMENT (OUTPATIENT)
Dept: FAMILY MEDICINE | Facility: CLINIC | Age: 37
End: 2022-01-31
Payer: COMMERCIAL

## 2022-01-31 VITALS
OXYGEN SATURATION: 98 % | SYSTOLIC BLOOD PRESSURE: 140 MMHG | DIASTOLIC BLOOD PRESSURE: 90 MMHG | TEMPERATURE: 97.7 F | HEIGHT: 62 IN | HEART RATE: 109 BPM

## 2022-01-31 DIAGNOSIS — R82.90 UNSPECIFIED ABNORMAL FINDINGS IN URINE: ICD-10-CM

## 2022-01-31 PROCEDURE — 81002 URINALYSIS NONAUTO W/O SCOPE: CPT

## 2022-01-31 PROCEDURE — 99214 OFFICE O/P EST MOD 30 MIN: CPT | Mod: 25

## 2022-01-31 PROCEDURE — 36415 COLL VENOUS BLD VENIPUNCTURE: CPT

## 2022-01-31 NOTE — HISTORY OF PRESENT ILLNESS
[FreeTextEntry8] : cc: lower back pain \par \par 38 yo f presents with pain on her lower right side. \par Increased frequency of urination, started yesterday as well. \par No burning with urination, no foul smell, cloudy urine started yesterday as well. \par No blood, no abnormal discharge. \par Admits hesitancy as well. \par

## 2022-01-31 NOTE — PHYSICAL EXAM
[Normal Sclera/Conjunctiva] : normal sclera/conjunctiva [Normal Outer Ear/Nose] : the outer ears and nose were normal in appearance [Normal] : affect was normal and insight and judgment were intact [de-identified] : slight cva tenderness on right

## 2022-01-31 NOTE — PLAN
[FreeTextEntry1] : uti \par symptoms likely sound like a uti \par will follow up cx\par based on symptoms and back pain will treat ppx with broad spec\par reviewed risks, benefits, side effects, alternatives, regimen \par will also start pyridium \par aware of when to seek ed txt \par \par bp \par stable on repeat \par encouraged low salt diet \par \par anemia hx \par repeat cbc, iron

## 2022-02-03 ENCOUNTER — TRANSCRIPTION ENCOUNTER (OUTPATIENT)
Age: 37
End: 2022-02-03

## 2022-02-03 LAB
APPEARANCE: ABNORMAL
APPEARANCE: ABNORMAL
BACTERIA: ABNORMAL
BASOPHILS # BLD AUTO: 0.05 K/UL
BASOPHILS NFR BLD AUTO: 0.5 %
BILIRUBIN URINE: NEGATIVE
BILIRUBIN URINE: NEGATIVE
BLOOD URINE: ABNORMAL
BLOOD URINE: ABNORMAL
COLOR: YELLOW
COLOR: YELLOW
EOSINOPHIL # BLD AUTO: 0.09 K/UL
EOSINOPHIL NFR BLD AUTO: 0.9 %
FERRITIN SERPL-MCNC: 31 NG/ML
GLUCOSE QUALITATIVE U: NEGATIVE
GLUCOSE QUALITATIVE U: NEGATIVE
HCT VFR BLD CALC: 36.3 %
HGB BLD-MCNC: 11 G/DL
HYALINE CASTS: 0 /LPF
IMM GRANULOCYTES NFR BLD AUTO: 0.3 %
IRON SERPL-MCNC: 26 UG/DL
KETONES URINE: NEGATIVE
KETONES URINE: NEGATIVE
LEUKOCYTE ESTERASE URINE: ABNORMAL
LEUKOCYTE ESTERASE URINE: ABNORMAL
LYMPHOCYTES # BLD AUTO: 1.32 K/UL
LYMPHOCYTES NFR BLD AUTO: 13.5 %
MAN DIFF?: NORMAL
MCHC RBC-ENTMCNC: 27.2 PG
MCHC RBC-ENTMCNC: 30.3 GM/DL
MCV RBC AUTO: 89.9 FL
MICROSCOPIC-UA: NORMAL
MONOCYTES # BLD AUTO: 0.72 K/UL
MONOCYTES NFR BLD AUTO: 7.4 %
NEUTROPHILS # BLD AUTO: 7.54 K/UL
NEUTROPHILS NFR BLD AUTO: 77.4 %
NITRITE URINE: POSITIVE
NITRITE URINE: POSITIVE
PH URINE: 6
PH URINE: 6
PLATELET # BLD AUTO: 294 K/UL
PROTEIN URINE: ABNORMAL
PROTEIN URINE: ABNORMAL
RBC # BLD: 4.04 M/UL
RBC # FLD: 17.3 %
RED BLOOD CELLS URINE: 69 /HPF
SPECIFIC GRAVITY URINE: 1.02
SPECIFIC GRAVITY URINE: 1.02
SQUAMOUS EPITHELIAL CELLS: 4 /HPF
TRANSFERRIN SERPL-MCNC: 278 MG/DL
URINE COMMENTS: NORMAL
UROBILINOGEN URINE: NORMAL
UROBILINOGEN URINE: NORMAL
WBC # FLD AUTO: 9.75 K/UL
WHITE BLOOD CELLS URINE: >720 /HPF

## 2022-02-11 ENCOUNTER — TRANSCRIPTION ENCOUNTER (OUTPATIENT)
Age: 37
End: 2022-02-11

## 2022-07-21 ENCOUNTER — APPOINTMENT (OUTPATIENT)
Dept: FAMILY MEDICINE | Facility: CLINIC | Age: 37
End: 2022-07-21

## 2022-07-21 VITALS
HEIGHT: 62 IN | HEART RATE: 100 BPM | DIASTOLIC BLOOD PRESSURE: 87 MMHG | OXYGEN SATURATION: 98 % | SYSTOLIC BLOOD PRESSURE: 129 MMHG | TEMPERATURE: 99.4 F

## 2022-07-21 DIAGNOSIS — Z01.818 ENCOUNTER FOR OTHER PREPROCEDURAL EXAMINATION: ICD-10-CM

## 2022-07-21 DIAGNOSIS — D21.9 BENIGN NEOPLASM OF CONNECTIVE AND OTHER SOFT TISSUE, UNSPECIFIED: ICD-10-CM

## 2022-07-21 PROCEDURE — 93000 ELECTROCARDIOGRAM COMPLETE: CPT

## 2022-07-21 PROCEDURE — 81002 URINALYSIS NONAUTO W/O SCOPE: CPT

## 2022-07-21 PROCEDURE — 99214 OFFICE O/P EST MOD 30 MIN: CPT | Mod: 25

## 2022-07-21 PROCEDURE — 36415 COLL VENOUS BLD VENIPUNCTURE: CPT

## 2022-07-21 RX ORDER — SULFAMETHOXAZOLE AND TRIMETHOPRIM 800; 160 MG/1; MG/1
800-160 TABLET ORAL
Qty: 6 | Refills: 0 | Status: DISCONTINUED | COMMUNITY
Start: 2022-01-31 | End: 2022-07-21

## 2022-07-21 RX ORDER — PHENAZOPYRIDINE 200 MG/1
200 TABLET, FILM COATED ORAL 3 TIMES DAILY
Qty: 6 | Refills: 0 | Status: DISCONTINUED | COMMUNITY
Start: 2022-01-31 | End: 2022-07-21

## 2022-07-27 ENCOUNTER — NON-APPOINTMENT (OUTPATIENT)
Age: 37
End: 2022-07-27

## 2022-07-27 LAB
ALBUMIN SERPL ELPH-MCNC: 4.3 G/DL
ALP BLD-CCNC: 44 U/L
ALT SERPL-CCNC: 11 U/L
ANION GAP SERPL CALC-SCNC: 10 MMOL/L
AST SERPL-CCNC: 14 U/L
BILIRUB SERPL-MCNC: 0.4 MG/DL
BUN SERPL-MCNC: 12 MG/DL
CALCIUM SERPL-MCNC: 10.1 MG/DL
CHLORIDE SERPL-SCNC: 104 MMOL/L
CO2 SERPL-SCNC: 24 MMOL/L
CREAT SERPL-MCNC: 0.7 MG/DL
EGFR: 113 ML/MIN/1.73M2
GLUCOSE SERPL-MCNC: 86 MG/DL
POTASSIUM SERPL-SCNC: 4.4 MMOL/L
PROT SERPL-MCNC: 7.3 G/DL
SODIUM SERPL-SCNC: 138 MMOL/L

## 2022-07-27 NOTE — HISTORY OF PRESENT ILLNESS
[No Pertinent Cardiac History] : no history of aortic stenosis, atrial fibrillation, coronary artery disease, recent myocardial infarction, or implantable device/pacemaker [No Pertinent Pulmonary History] : no history of asthma, COPD, sleep apnea, or smoking [No Adverse Anesthesia Reaction] : no adverse anesthesia reaction in self or family member [(Patient denies any chest pain, claudication, dyspnea on exertion, orthopnea, palpitations or syncope)] : Patient denies any chest pain, claudication, dyspnea on exertion, orthopnea, palpitations or syncope [Good (7-10 METs)] : Good (7-10 METs) [Chronic Anticoagulation] : no chronic anticoagulation [Chronic Kidney Disease] : no chronic kidney disease [Diabetes] : no diabetes [FreeTextEntry1] : open myomectomy  [FreeTextEntry2] : 8/2/22 [FreeTextEntry3] : Dr. Mistry  [FreeTextEntry4] : 38 yo f presents for preop. \par Sx needed because fibroids. \par Denies fevers, chills, cp, sob. \par Active lifestyle-- no sob, no cp. \par Has had sx before, has done well with anesthesia. \par

## 2022-07-27 NOTE — PLAN
[FreeTextEntry1] : follow up labs, labs drawn in office \par follow up ekg \par covid testing pending with sx team \par

## 2022-08-01 ENCOUNTER — TRANSCRIPTION ENCOUNTER (OUTPATIENT)
Age: 37
End: 2022-08-01

## 2022-08-01 VITALS
SYSTOLIC BLOOD PRESSURE: 126 MMHG | RESPIRATION RATE: 16 BRPM | OXYGEN SATURATION: 100 % | HEIGHT: 62 IN | HEART RATE: 78 BPM | WEIGHT: 225.97 LBS | TEMPERATURE: 99 F | DIASTOLIC BLOOD PRESSURE: 82 MMHG

## 2022-08-01 RX ORDER — HEPARIN SODIUM 5000 [USP'U]/ML
5000 INJECTION INTRAVENOUS; SUBCUTANEOUS ONCE
Refills: 0 | Status: DISCONTINUED | OUTPATIENT
Start: 2022-08-02 | End: 2022-08-03

## 2022-08-01 RX ORDER — CELECOXIB 200 MG/1
400 CAPSULE ORAL ONCE
Refills: 0 | Status: DISCONTINUED | OUTPATIENT
Start: 2022-08-02 | End: 2022-08-03

## 2022-08-01 RX ORDER — ACETAMINOPHEN 500 MG
1000 TABLET ORAL ONCE
Refills: 0 | Status: DISCONTINUED | OUTPATIENT
Start: 2022-08-02 | End: 2022-08-03

## 2022-08-01 RX ORDER — TRAMADOL HYDROCHLORIDE 50 MG/1
1 TABLET ORAL
Qty: 0 | Refills: 0 | DISCHARGE

## 2022-08-01 RX ORDER — GABAPENTIN 400 MG/1
300 CAPSULE ORAL ONCE
Refills: 0 | Status: DISCONTINUED | OUTPATIENT
Start: 2022-08-02 | End: 2022-08-03

## 2022-08-01 NOTE — PATIENT PROFILE ADULT - FALL HARM RISK - UNIVERSAL INTERVENTIONS
Bed in lowest position, wheels locked, appropriate side rails in place/Call bell, personal items and telephone in reach/Instruct patient to call for assistance before getting out of bed or chair/Non-slip footwear when patient is out of bed/[place] to call system/Physically safe environment - no spills, clutter or unnecessary equipment/Purposeful Proactive Rounding/Room/bathroom lighting operational, light cord in reach

## 2022-08-02 ENCOUNTER — RESULT REVIEW (OUTPATIENT)
Age: 37
End: 2022-08-02

## 2022-08-02 ENCOUNTER — INPATIENT (INPATIENT)
Facility: HOSPITAL | Age: 37
LOS: 3 days | Discharge: HOME | DRG: 742 | End: 2022-08-06
Attending: OBSTETRICS & GYNECOLOGY | Admitting: OBSTETRICS & GYNECOLOGY
Payer: COMMERCIAL

## 2022-08-02 ENCOUNTER — TRANSCRIPTION ENCOUNTER (OUTPATIENT)
Age: 37
End: 2022-08-02

## 2022-08-02 DIAGNOSIS — Z98.890 OTHER SPECIFIED POSTPROCEDURAL STATES: Chronic | ICD-10-CM

## 2022-08-02 DIAGNOSIS — Z41.9 ENCOUNTER FOR PROCEDURE FOR PURPOSES OTHER THAN REMEDYING HEALTH STATE, UNSPECIFIED: Chronic | ICD-10-CM

## 2022-08-02 LAB
APTT BLD: 25 SEC — LOW (ref 27.5–35.5)
BLD GP AB SCN SERPL QL: NEGATIVE — SIGNIFICANT CHANGE UP
FIBRINOGEN PPP-MCNC: 293 MG/DL — SIGNIFICANT CHANGE UP (ref 258–438)
HCT VFR BLD CALC: 35.5 % — SIGNIFICANT CHANGE UP (ref 34.5–45)
HGB BLD-MCNC: 11.6 G/DL — SIGNIFICANT CHANGE UP (ref 11.5–15.5)
INR BLD: 1.17 — HIGH (ref 0.88–1.16)
MCHC RBC-ENTMCNC: 29.8 PG — SIGNIFICANT CHANGE UP (ref 27–34)
MCHC RBC-ENTMCNC: 32.7 GM/DL — SIGNIFICANT CHANGE UP (ref 32–36)
MCV RBC AUTO: 91.3 FL — SIGNIFICANT CHANGE UP (ref 80–100)
NRBC # BLD: 0 /100 WBCS — SIGNIFICANT CHANGE UP (ref 0–0)
PLATELET # BLD AUTO: 204 K/UL — SIGNIFICANT CHANGE UP (ref 150–400)
PROTHROM AB SERPL-ACNC: 14 SEC — HIGH (ref 10.5–13.4)
RBC # BLD: 3.89 M/UL — SIGNIFICANT CHANGE UP (ref 3.8–5.2)
RBC # FLD: 14 % — SIGNIFICANT CHANGE UP (ref 10.3–14.5)
RH IG SCN BLD-IMP: POSITIVE — SIGNIFICANT CHANGE UP
WBC # BLD: 15.86 K/UL — HIGH (ref 3.8–10.5)
WBC # FLD AUTO: 15.86 K/UL — HIGH (ref 3.8–10.5)

## 2022-08-02 PROCEDURE — 88305 TISSUE EXAM BY PATHOLOGIST: CPT | Mod: 26

## 2022-08-02 DEVICE — INTERCEED 3 X 4": Type: IMPLANTABLE DEVICE | Status: FUNCTIONAL

## 2022-08-02 DEVICE — ARISTA 3GR: Type: IMPLANTABLE DEVICE | Status: FUNCTIONAL

## 2022-08-02 RX ORDER — HYDROMORPHONE HYDROCHLORIDE 2 MG/ML
0.5 INJECTION INTRAMUSCULAR; INTRAVENOUS; SUBCUTANEOUS
Refills: 0 | Status: DISCONTINUED | OUTPATIENT
Start: 2022-08-02 | End: 2022-08-02

## 2022-08-02 RX ORDER — SIMETHICONE 80 MG/1
80 TABLET, CHEWABLE ORAL EVERY 6 HOURS
Refills: 0 | Status: DISCONTINUED | OUTPATIENT
Start: 2022-08-02 | End: 2022-08-03

## 2022-08-02 RX ORDER — METOCLOPRAMIDE HCL 10 MG
10 TABLET ORAL EVERY 6 HOURS
Refills: 0 | Status: DISCONTINUED | OUTPATIENT
Start: 2022-08-02 | End: 2022-08-06

## 2022-08-02 RX ORDER — KETOROLAC TROMETHAMINE 30 MG/ML
30 SYRINGE (ML) INJECTION EVERY 8 HOURS
Refills: 0 | Status: DISCONTINUED | OUTPATIENT
Start: 2022-08-02 | End: 2022-08-03

## 2022-08-02 RX ORDER — ACETAMINOPHEN 500 MG
1000 TABLET ORAL EVERY 6 HOURS
Refills: 0 | Status: DISCONTINUED | OUTPATIENT
Start: 2022-08-02 | End: 2022-08-03

## 2022-08-02 RX ORDER — ONDANSETRON 8 MG/1
8 TABLET, FILM COATED ORAL EVERY 6 HOURS
Refills: 0 | Status: DISCONTINUED | OUTPATIENT
Start: 2022-08-02 | End: 2022-08-06

## 2022-08-02 RX ORDER — ACETAMINOPHEN 500 MG
1000 TABLET ORAL EVERY 6 HOURS
Refills: 0 | Status: DISCONTINUED | OUTPATIENT
Start: 2022-08-02 | End: 2022-08-06

## 2022-08-02 RX ORDER — OXYCODONE HYDROCHLORIDE 5 MG/1
5 TABLET ORAL
Refills: 0 | Status: DISCONTINUED | OUTPATIENT
Start: 2022-08-02 | End: 2022-08-06

## 2022-08-02 RX ORDER — SIMETHICONE 80 MG/1
80 TABLET, CHEWABLE ORAL EVERY 6 HOURS
Refills: 0 | Status: DISCONTINUED | OUTPATIENT
Start: 2022-08-02 | End: 2022-08-06

## 2022-08-02 RX ORDER — IRON SUCROSE 20 MG/ML
300 INJECTION, SOLUTION INTRAVENOUS EVERY 24 HOURS
Refills: 0 | Status: DISCONTINUED | OUTPATIENT
Start: 2022-08-03 | End: 2022-08-06

## 2022-08-02 RX ORDER — HYDROMORPHONE HYDROCHLORIDE 2 MG/ML
0.2 INJECTION INTRAMUSCULAR; INTRAVENOUS; SUBCUTANEOUS
Refills: 0 | Status: DISCONTINUED | OUTPATIENT
Start: 2022-08-02 | End: 2022-08-02

## 2022-08-02 RX ORDER — IBUPROFEN 200 MG
600 TABLET ORAL EVERY 6 HOURS
Refills: 0 | Status: DISCONTINUED | OUTPATIENT
Start: 2022-08-02 | End: 2022-08-03

## 2022-08-02 RX ORDER — OXYCODONE HYDROCHLORIDE 5 MG/1
10 TABLET ORAL EVERY 4 HOURS
Refills: 0 | Status: DISCONTINUED | OUTPATIENT
Start: 2022-08-02 | End: 2022-08-06

## 2022-08-02 RX ORDER — SODIUM CHLORIDE 9 MG/ML
1000 INJECTION, SOLUTION INTRAVENOUS
Refills: 0 | Status: DISCONTINUED | OUTPATIENT
Start: 2022-08-02 | End: 2022-08-04

## 2022-08-02 RX ADMIN — HYDROMORPHONE HYDROCHLORIDE 0.5 MILLIGRAM(S): 2 INJECTION INTRAMUSCULAR; INTRAVENOUS; SUBCUTANEOUS at 21:29

## 2022-08-02 RX ADMIN — HYDROMORPHONE HYDROCHLORIDE 0.5 MILLIGRAM(S): 2 INJECTION INTRAMUSCULAR; INTRAVENOUS; SUBCUTANEOUS at 21:57

## 2022-08-02 RX ADMIN — OXYCODONE HYDROCHLORIDE 10 MILLIGRAM(S): 5 TABLET ORAL at 23:59

## 2022-08-02 RX ADMIN — Medication 30 MILLIGRAM(S): at 21:38

## 2022-08-02 RX ADMIN — HYDROMORPHONE HYDROCHLORIDE 0.5 MILLIGRAM(S): 2 INJECTION INTRAMUSCULAR; INTRAVENOUS; SUBCUTANEOUS at 21:14

## 2022-08-02 RX ADMIN — HYDROMORPHONE HYDROCHLORIDE 0.2 MILLIGRAM(S): 2 INJECTION INTRAMUSCULAR; INTRAVENOUS; SUBCUTANEOUS at 20:56

## 2022-08-02 RX ADMIN — Medication 1000 MILLIGRAM(S): at 23:59

## 2022-08-02 RX ADMIN — HYDROMORPHONE HYDROCHLORIDE 0.2 MILLIGRAM(S): 2 INJECTION INTRAMUSCULAR; INTRAVENOUS; SUBCUTANEOUS at 20:41

## 2022-08-02 RX ADMIN — HYDROMORPHONE HYDROCHLORIDE 0.5 MILLIGRAM(S): 2 INJECTION INTRAMUSCULAR; INTRAVENOUS; SUBCUTANEOUS at 22:09

## 2022-08-02 RX ADMIN — OXYCODONE HYDROCHLORIDE 10 MILLIGRAM(S): 5 TABLET ORAL at 22:59

## 2022-08-02 RX ADMIN — HYDROMORPHONE HYDROCHLORIDE 0.5 MILLIGRAM(S): 2 INJECTION INTRAMUSCULAR; INTRAVENOUS; SUBCUTANEOUS at 22:19

## 2022-08-02 RX ADMIN — Medication 1000 MILLIGRAM(S): at 22:59

## 2022-08-02 RX ADMIN — SODIUM CHLORIDE 140 MILLILITER(S): 9 INJECTION, SOLUTION INTRAVENOUS at 20:41

## 2022-08-02 RX ADMIN — Medication 30 MILLIGRAM(S): at 21:53

## 2022-08-02 RX ADMIN — SIMETHICONE 80 MILLIGRAM(S): 80 TABLET, CHEWABLE ORAL at 22:58

## 2022-08-02 NOTE — BRIEF OPERATIVE NOTE - OPERATION/FINDINGS
Low transverse abdominal incision, large multifibroid uterus noted and exteriorized. Normal ovaries and tubes noted.   55 fibroids removed. Uterine cavity entered. Uterus closed with multiple vicril sutures. Arthur and interceed used. Good hemostasis noted. EBL 2000cc. 700 cc of RBC returned using cell saver. Anesthesia did Tab Block at the end of the procedure. Low transverse abdominal incision, large multifibroid uterus noted and exteriorized. Normal ovaries and tubes noted.   57 fibroids removed. Uterine cavity entered. Uterus closed with multiple vycril l sutures. Arthur and interceed used. Good hemostasis noted. EBL 2000cc. 700 cc of RBC returned using cell saver. Anesthesia did Tab Block at the end of the procedure.

## 2022-08-02 NOTE — H&P ADULT - ASSESSMENT
37y y.o. s/p abdominal myomectomy of 55 fibroids, entered cavity, admitted for pain control and postoperative monitoring.    - Hb 13.4  - Cr 0.70

## 2022-08-02 NOTE — PROGRESS NOTE ADULT - ASSESSMENT
A:   37y, s/p abdominal myomectomy of 55 fibroids, entered cavity, admitted for pain control and postoperative monitoring, Meeting postop milestones appropriately.     Plan:  1. Vital signs stable, continue to monitor per protocol  2. Pain control Tylenol/Toradol, Oxycodone PRN for BTP.  3. DVT prophylaxis: SCDs,   4. CV: IVH 140cc/hr  5. Pulm: Incentive spirometer (at least 10 times per hour while awake)   6. GI: CLD diet   7. : Das in place  8. Follow up labs: AM CBC, BMP  9. Activity: bedrest tonight

## 2022-08-02 NOTE — H&P ADULT - HISTORY OF PRESENT ILLNESS
Patient is a 37y . P0 presents for scheduled abdominal myomectomy.  Patient has a h/o of fibroids with menorrhagia, mild asthma. Denies chest pain, palpitations, SOB, n/v, diarrhea, constipation.    Ob hx: G0  Gyn hx: Fibroids  PMH: Mild asthma, never intubated/admitted  Meds: PO iron  PSH: Denies  Allergies: Tetracyclines - rash    Physical Exam  Gen: Well-appearing. NAD.  Resp: Breathing comfortably on RA.   Abd: Soft, non-tender, non-distended. 20 wk size uterus.  Ext: No calf tenderness Patient is a 37y . P0 presents for scheduled abdominal myomectomy.  Patient has a h/o of fibroids with menorrhagia, mild asthma. Denies chest pain, palpitations, SOB, n/v, diarrhea, constipation.    Ob hx: G0  Gyn hx: Fibroids  PMH: Mild asthma, never intubated/admitted  Meds: PO iron  PSH: Denies  Allergies: Tetracyclines - rash    Physical Exam  Gen: Well-appearing. NAD.  Resp: Breathing comfortably on RA.   Abd: Soft, non-tender, non-distended. 24 wk size uterus.  Ext: No calf tenderness

## 2022-08-03 LAB
ALBUMIN SERPL ELPH-MCNC: 2.9 G/DL — LOW (ref 3.3–5)
ANION GAP SERPL CALC-SCNC: 10 MMOL/L — SIGNIFICANT CHANGE UP (ref 5–17)
ANION GAP SERPL CALC-SCNC: 11 MMOL/L — SIGNIFICANT CHANGE UP (ref 5–17)
ANION GAP SERPL CALC-SCNC: 9 MMOL/L — SIGNIFICANT CHANGE UP (ref 5–17)
APTT BLD: 25.4 SEC — LOW (ref 27.5–35.5)
BASOPHILS # BLD AUTO: 0.01 K/UL — SIGNIFICANT CHANGE UP (ref 0–0.2)
BASOPHILS NFR BLD AUTO: 0.1 % — SIGNIFICANT CHANGE UP (ref 0–2)
BUN SERPL-MCNC: 15 MG/DL — SIGNIFICANT CHANGE UP (ref 7–23)
BUN SERPL-MCNC: 19 MG/DL — SIGNIFICANT CHANGE UP (ref 7–23)
BUN SERPL-MCNC: 21 MG/DL — SIGNIFICANT CHANGE UP (ref 7–23)
CALCIUM SERPL-MCNC: 6.5 MG/DL — CRITICAL LOW (ref 8.4–10.5)
CALCIUM SERPL-MCNC: 6.6 MG/DL — LOW (ref 8.4–10.5)
CALCIUM SERPL-MCNC: 7.4 MG/DL — LOW (ref 8.4–10.5)
CHLORIDE SERPL-SCNC: 102 MMOL/L — SIGNIFICANT CHANGE UP (ref 96–108)
CHLORIDE SERPL-SCNC: 103 MMOL/L — SIGNIFICANT CHANGE UP (ref 96–108)
CHLORIDE SERPL-SCNC: 107 MMOL/L — SIGNIFICANT CHANGE UP (ref 96–108)
CHLORIDE UR-SCNC: 24 MMOL/L — SIGNIFICANT CHANGE UP
CO2 SERPL-SCNC: 18 MMOL/L — LOW (ref 22–31)
CO2 SERPL-SCNC: 19 MMOL/L — LOW (ref 22–31)
CO2 SERPL-SCNC: 21 MMOL/L — LOW (ref 22–31)
CREAT ?TM UR-MCNC: 243 MG/DL — SIGNIFICANT CHANGE UP
CREAT ?TM UR-MCNC: 291 MG/DL — SIGNIFICANT CHANGE UP
CREAT SERPL-MCNC: 1.36 MG/DL — HIGH (ref 0.5–1.3)
CREAT SERPL-MCNC: 1.52 MG/DL — HIGH (ref 0.5–1.3)
CREAT SERPL-MCNC: 1.55 MG/DL — HIGH (ref 0.5–1.3)
EGFR: 43 ML/MIN/1.73M2 — LOW
EGFR: 44 ML/MIN/1.73M2 — LOW
EGFR: 51 ML/MIN/1.73M2 — LOW
EOSINOPHIL # BLD AUTO: 0 K/UL — SIGNIFICANT CHANGE UP (ref 0–0.5)
EOSINOPHIL NFR BLD AUTO: 0 % — SIGNIFICANT CHANGE UP (ref 0–6)
FIBRINOGEN PPP-MCNC: 384 MG/DL — SIGNIFICANT CHANGE UP (ref 258–438)
GLUCOSE BLDC GLUCOMTR-MCNC: 208 MG/DL — HIGH (ref 70–99)
GLUCOSE SERPL-MCNC: 131 MG/DL — HIGH (ref 70–99)
GLUCOSE SERPL-MCNC: 141 MG/DL — HIGH (ref 70–99)
GLUCOSE SERPL-MCNC: 166 MG/DL — HIGH (ref 70–99)
HCT VFR BLD CALC: 26.7 % — LOW (ref 34.5–45)
HCT VFR BLD CALC: 28.5 % — LOW (ref 34.5–45)
HCT VFR BLD CALC: 36 % — SIGNIFICANT CHANGE UP (ref 34.5–45)
HGB BLD-MCNC: 11.5 G/DL — SIGNIFICANT CHANGE UP (ref 11.5–15.5)
HGB BLD-MCNC: 8.7 G/DL — LOW (ref 11.5–15.5)
HGB BLD-MCNC: 9.2 G/DL — LOW (ref 11.5–15.5)
IMM GRANULOCYTES NFR BLD AUTO: 0.4 % — SIGNIFICANT CHANGE UP (ref 0–1.5)
INR BLD: 1.12 — SIGNIFICANT CHANGE UP (ref 0.88–1.16)
LYMPHOCYTES # BLD AUTO: 0.93 K/UL — LOW (ref 1–3.3)
LYMPHOCYTES # BLD AUTO: 5.9 % — LOW (ref 13–44)
MAGNESIUM SERPL-MCNC: 1.2 MG/DL — LOW (ref 1.6–2.6)
MAGNESIUM SERPL-MCNC: 1.4 MG/DL — LOW (ref 1.6–2.6)
MAGNESIUM SERPL-MCNC: 1.5 MG/DL — LOW (ref 1.6–2.6)
MAGNESIUM UR-MCNC: <1 MG/DL — SIGNIFICANT CHANGE UP
MCHC RBC-ENTMCNC: 29.4 PG — SIGNIFICANT CHANGE UP (ref 27–34)
MCHC RBC-ENTMCNC: 29.4 PG — SIGNIFICANT CHANGE UP (ref 27–34)
MCHC RBC-ENTMCNC: 29.7 PG — SIGNIFICANT CHANGE UP (ref 27–34)
MCHC RBC-ENTMCNC: 31.9 GM/DL — LOW (ref 32–36)
MCHC RBC-ENTMCNC: 32.3 GM/DL — SIGNIFICANT CHANGE UP (ref 32–36)
MCHC RBC-ENTMCNC: 32.6 GM/DL — SIGNIFICANT CHANGE UP (ref 32–36)
MCV RBC AUTO: 90.2 FL — SIGNIFICANT CHANGE UP (ref 80–100)
MCV RBC AUTO: 91.9 FL — SIGNIFICANT CHANGE UP (ref 80–100)
MCV RBC AUTO: 92.1 FL — SIGNIFICANT CHANGE UP (ref 80–100)
MONOCYTES # BLD AUTO: 1.19 K/UL — HIGH (ref 0–0.9)
MONOCYTES NFR BLD AUTO: 7.5 % — SIGNIFICANT CHANGE UP (ref 2–14)
NEUTROPHILS # BLD AUTO: 13.69 K/UL — HIGH (ref 1.8–7.4)
NEUTROPHILS NFR BLD AUTO: 86.1 % — HIGH (ref 43–77)
NRBC # BLD: 0 /100 WBCS — SIGNIFICANT CHANGE UP (ref 0–0)
OSMOLALITY UR: 456 MOSM/KG — SIGNIFICANT CHANGE UP (ref 300–900)
PHOSPHATE SERPL-MCNC: 2.9 MG/DL — SIGNIFICANT CHANGE UP (ref 2.5–4.5)
PHOSPHATE SERPL-MCNC: 3.4 MG/DL — SIGNIFICANT CHANGE UP (ref 2.5–4.5)
PHOSPHATE SERPL-MCNC: 5.7 MG/DL — HIGH (ref 2.5–4.5)
PLATELET # BLD AUTO: 156 K/UL — SIGNIFICANT CHANGE UP (ref 150–400)
PLATELET # BLD AUTO: 157 K/UL — SIGNIFICANT CHANGE UP (ref 150–400)
PLATELET # BLD AUTO: 227 K/UL — SIGNIFICANT CHANGE UP (ref 150–400)
POTASSIUM SERPL-MCNC: 4.6 MMOL/L — SIGNIFICANT CHANGE UP (ref 3.5–5.3)
POTASSIUM SERPL-MCNC: 5.4 MMOL/L — HIGH (ref 3.5–5.3)
POTASSIUM SERPL-MCNC: 5.8 MMOL/L — HIGH (ref 3.5–5.3)
POTASSIUM SERPL-SCNC: 4.6 MMOL/L — SIGNIFICANT CHANGE UP (ref 3.5–5.3)
POTASSIUM SERPL-SCNC: 5.4 MMOL/L — HIGH (ref 3.5–5.3)
POTASSIUM SERPL-SCNC: 5.8 MMOL/L — HIGH (ref 3.5–5.3)
POTASSIUM UR-SCNC: 87 MMOL/L — SIGNIFICANT CHANGE UP
PROTHROM AB SERPL-ACNC: 13.3 SEC — SIGNIFICANT CHANGE UP (ref 10.5–13.4)
RBC # BLD: 2.96 M/UL — LOW (ref 3.8–5.2)
RBC # BLD: 3.1 M/UL — LOW (ref 3.8–5.2)
RBC # BLD: 3.91 M/UL — SIGNIFICANT CHANGE UP (ref 3.8–5.2)
RBC # FLD: 13.8 % — SIGNIFICANT CHANGE UP (ref 10.3–14.5)
RBC # FLD: 13.9 % — SIGNIFICANT CHANGE UP (ref 10.3–14.5)
RBC # FLD: 14.1 % — SIGNIFICANT CHANGE UP (ref 10.3–14.5)
SODIUM SERPL-SCNC: 131 MMOL/L — LOW (ref 135–145)
SODIUM SERPL-SCNC: 133 MMOL/L — LOW (ref 135–145)
SODIUM SERPL-SCNC: 136 MMOL/L — SIGNIFICANT CHANGE UP (ref 135–145)
SODIUM UR-SCNC: <20 MMOL/L — SIGNIFICANT CHANGE UP
SODIUM UR-SCNC: <20 MMOL/L — SIGNIFICANT CHANGE UP
WBC # BLD: 15.88 K/UL — HIGH (ref 3.8–10.5)
WBC # BLD: 16.08 K/UL — HIGH (ref 3.8–10.5)
WBC # BLD: 25.37 K/UL — HIGH (ref 3.8–10.5)
WBC # FLD AUTO: 15.88 K/UL — HIGH (ref 3.8–10.5)
WBC # FLD AUTO: 16.08 K/UL — HIGH (ref 3.8–10.5)
WBC # FLD AUTO: 25.37 K/UL — HIGH (ref 3.8–10.5)

## 2022-08-03 RX ORDER — SODIUM CHLORIDE 9 MG/ML
1000 INJECTION INTRAMUSCULAR; INTRAVENOUS; SUBCUTANEOUS
Refills: 0 | Status: DISCONTINUED | OUTPATIENT
Start: 2022-08-03 | End: 2022-08-04

## 2022-08-03 RX ORDER — MAGNESIUM SULFATE 500 MG/ML
1 VIAL (ML) INJECTION ONCE
Refills: 0 | Status: COMPLETED | OUTPATIENT
Start: 2022-08-03 | End: 2022-08-03

## 2022-08-03 RX ORDER — SODIUM,POTASSIUM PHOSPHATES 278-250MG
1 POWDER IN PACKET (EA) ORAL ONCE
Refills: 0 | Status: COMPLETED | OUTPATIENT
Start: 2022-08-03 | End: 2022-08-03

## 2022-08-03 RX ORDER — KETOROLAC TROMETHAMINE 30 MG/ML
30 SYRINGE (ML) INJECTION EVERY 6 HOURS
Refills: 0 | Status: DISCONTINUED | OUTPATIENT
Start: 2022-08-03 | End: 2022-08-03

## 2022-08-03 RX ORDER — MAGNESIUM SULFATE 500 MG/ML
2 VIAL (ML) INJECTION ONCE
Refills: 0 | Status: COMPLETED | OUTPATIENT
Start: 2022-08-03 | End: 2022-08-04

## 2022-08-03 RX ORDER — SODIUM CHLORIDE 9 MG/ML
500 INJECTION, SOLUTION INTRAVENOUS ONCE
Refills: 0 | Status: COMPLETED | OUTPATIENT
Start: 2022-08-03 | End: 2022-08-03

## 2022-08-03 RX ORDER — HYDROMORPHONE HYDROCHLORIDE 2 MG/ML
0.5 INJECTION INTRAMUSCULAR; INTRAVENOUS; SUBCUTANEOUS ONCE
Refills: 0 | Status: DISCONTINUED | OUTPATIENT
Start: 2022-08-03 | End: 2022-08-03

## 2022-08-03 RX ORDER — ACETAMINOPHEN 500 MG
1000 TABLET ORAL ONCE
Refills: 0 | Status: COMPLETED | OUTPATIENT
Start: 2022-08-03 | End: 2022-08-03

## 2022-08-03 RX ORDER — CALCIUM CARBONATE 500(1250)
2 TABLET ORAL ONCE
Refills: 0 | Status: COMPLETED | OUTPATIENT
Start: 2022-08-03 | End: 2022-08-03

## 2022-08-03 RX ORDER — SODIUM CHLORIDE 9 MG/ML
1000 INJECTION, SOLUTION INTRAVENOUS ONCE
Refills: 0 | Status: COMPLETED | OUTPATIENT
Start: 2022-08-03 | End: 2022-08-03

## 2022-08-03 RX ORDER — ACETAMINOPHEN 500 MG
1000 TABLET ORAL EVERY 6 HOURS
Refills: 0 | Status: DISCONTINUED | OUTPATIENT
Start: 2022-08-04 | End: 2022-08-06

## 2022-08-03 RX ADMIN — SIMETHICONE 80 MILLIGRAM(S): 80 TABLET, CHEWABLE ORAL at 17:03

## 2022-08-03 RX ADMIN — SODIUM CHLORIDE 1000 MILLILITER(S): 9 INJECTION INTRAMUSCULAR; INTRAVENOUS; SUBCUTANEOUS at 14:12

## 2022-08-03 RX ADMIN — Medication 100 GRAM(S): at 15:16

## 2022-08-03 RX ADMIN — OXYCODONE HYDROCHLORIDE 10 MILLIGRAM(S): 5 TABLET ORAL at 03:55

## 2022-08-03 RX ADMIN — Medication 400 MILLIGRAM(S): at 20:28

## 2022-08-03 RX ADMIN — OXYCODONE HYDROCHLORIDE 10 MILLIGRAM(S): 5 TABLET ORAL at 18:20

## 2022-08-03 RX ADMIN — HYDROMORPHONE HYDROCHLORIDE 0.5 MILLIGRAM(S): 2 INJECTION INTRAMUSCULAR; INTRAVENOUS; SUBCUTANEOUS at 01:46

## 2022-08-03 RX ADMIN — Medication 1 PACKET(S): at 19:48

## 2022-08-03 RX ADMIN — SIMETHICONE 80 MILLIGRAM(S): 80 TABLET, CHEWABLE ORAL at 05:02

## 2022-08-03 RX ADMIN — OXYCODONE HYDROCHLORIDE 10 MILLIGRAM(S): 5 TABLET ORAL at 04:55

## 2022-08-03 RX ADMIN — OXYCODONE HYDROCHLORIDE 10 MILLIGRAM(S): 5 TABLET ORAL at 17:40

## 2022-08-03 RX ADMIN — OXYCODONE HYDROCHLORIDE 10 MILLIGRAM(S): 5 TABLET ORAL at 13:31

## 2022-08-03 RX ADMIN — OXYCODONE HYDROCHLORIDE 10 MILLIGRAM(S): 5 TABLET ORAL at 08:30

## 2022-08-03 RX ADMIN — OXYCODONE HYDROCHLORIDE 10 MILLIGRAM(S): 5 TABLET ORAL at 09:05

## 2022-08-03 RX ADMIN — OXYCODONE HYDROCHLORIDE 10 MILLIGRAM(S): 5 TABLET ORAL at 14:12

## 2022-08-03 RX ADMIN — SIMETHICONE 80 MILLIGRAM(S): 80 TABLET, CHEWABLE ORAL at 11:01

## 2022-08-03 RX ADMIN — OXYCODONE HYDROCHLORIDE 10 MILLIGRAM(S): 5 TABLET ORAL at 22:00

## 2022-08-03 RX ADMIN — SODIUM CHLORIDE 1000 MILLILITER(S): 9 INJECTION, SOLUTION INTRAVENOUS at 06:37

## 2022-08-03 RX ADMIN — HYDROMORPHONE HYDROCHLORIDE 0.5 MILLIGRAM(S): 2 INJECTION INTRAMUSCULAR; INTRAVENOUS; SUBCUTANEOUS at 23:44

## 2022-08-03 RX ADMIN — OXYCODONE HYDROCHLORIDE 10 MILLIGRAM(S): 5 TABLET ORAL at 23:00

## 2022-08-03 RX ADMIN — HYDROMORPHONE HYDROCHLORIDE 0.5 MILLIGRAM(S): 2 INJECTION INTRAMUSCULAR; INTRAVENOUS; SUBCUTANEOUS at 02:01

## 2022-08-03 RX ADMIN — Medication 1000 MILLIGRAM(S): at 21:00

## 2022-08-03 RX ADMIN — SIMETHICONE 80 MILLIGRAM(S): 80 TABLET, CHEWABLE ORAL at 23:21

## 2022-08-03 RX ADMIN — Medication 2 TABLET(S): at 23:17

## 2022-08-03 RX ADMIN — SODIUM CHLORIDE 1000 MILLILITER(S): 9 INJECTION, SOLUTION INTRAVENOUS at 04:22

## 2022-08-03 RX ADMIN — IRON SUCROSE 176.67 MILLIGRAM(S): 20 INJECTION, SOLUTION INTRAVENOUS at 10:58

## 2022-08-03 NOTE — PROGRESS NOTE ADULT - ASSESSMENT
37y y.o. s/p abdominal myomectomy of 55 fibroids, entered cavity.  Neuro: Tylenol, Toradol, Oxy PRN  Pulm: RA, ISS  Cards: LR @180cc/hr- s/p 500 cc bolus during the night and 1000 cc this morning. Repeat labs on 12:00  GI: CLD, -/-, consider advancing diet  : Jones, not adequatly voiding, Prerenal. Continue high maintainance at 180/h, keep jones for now  DVT: SCD     37y y.o. s/p abdominal myomectomy of 57 fibroids, entered cavity.  Neuro: Tylenol, Toradol, Oxy PRN  Pulm: RA, ISS  Cards: LR @180cc/hr- s/p 500 cc bolus during the night and 1000 cc this morning. Repeat labs on 12:00  GI: CLD, -/-, consider advancing diet  : Jones, not adequatly voiding, Prerenal. Continue high maintainance at 180/h, keep jones for now  DVT: SCD

## 2022-08-03 NOTE — CHART NOTE - NSCHARTNOTEFT_GEN_A_CORE
Pt seen at bedside c/o abd cramping in burning. 9/10 pain. Pt tender to palpation in Lower quadrants around pressure dressing. no rebound or guarding. Pt encouraged to increase PO intake of water. Pt to have 1x IVP dilaudid 0.5mg. Das examined to not be kinked. continue to monitor UOP and pain

## 2022-08-04 LAB
ALBUMIN SERPL ELPH-MCNC: 2.8 G/DL — LOW (ref 3.3–5)
ALP SERPL-CCNC: 62 U/L — SIGNIFICANT CHANGE UP (ref 40–120)
ALT FLD-CCNC: 17 U/L — SIGNIFICANT CHANGE UP (ref 10–45)
ANION GAP SERPL CALC-SCNC: 10 MMOL/L — SIGNIFICANT CHANGE UP (ref 5–17)
ANION GAP SERPL CALC-SCNC: 9 MMOL/L — SIGNIFICANT CHANGE UP (ref 5–17)
APTT BLD: 24.1 SEC — LOW (ref 27.5–35.5)
AST SERPL-CCNC: 47 U/L — HIGH (ref 10–40)
BASOPHILS # BLD AUTO: 0.02 K/UL — SIGNIFICANT CHANGE UP (ref 0–0.2)
BASOPHILS # BLD AUTO: 0.02 K/UL — SIGNIFICANT CHANGE UP (ref 0–0.2)
BASOPHILS NFR BLD AUTO: 0.1 % — SIGNIFICANT CHANGE UP (ref 0–2)
BASOPHILS NFR BLD AUTO: 0.1 % — SIGNIFICANT CHANGE UP (ref 0–2)
BILIRUB SERPL-MCNC: 1.3 MG/DL — HIGH (ref 0.2–1.2)
BUN SERPL-MCNC: 18 MG/DL — SIGNIFICANT CHANGE UP (ref 7–23)
BUN SERPL-MCNC: 21 MG/DL — SIGNIFICANT CHANGE UP (ref 7–23)
CALCIUM SERPL-MCNC: 7 MG/DL — LOW (ref 8.4–10.5)
CALCIUM SERPL-MCNC: 7.3 MG/DL — LOW (ref 8.4–10.5)
CHLORIDE SERPL-SCNC: 101 MMOL/L — SIGNIFICANT CHANGE UP (ref 96–108)
CHLORIDE SERPL-SCNC: 104 MMOL/L — SIGNIFICANT CHANGE UP (ref 96–108)
CHLORIDE UR-SCNC: 20 MMOL/L — SIGNIFICANT CHANGE UP
CO2 SERPL-SCNC: 21 MMOL/L — LOW (ref 22–31)
CO2 SERPL-SCNC: 21 MMOL/L — LOW (ref 22–31)
CREAT ?TM UR-MCNC: 21 MG/DL — SIGNIFICANT CHANGE UP
CREAT SERPL-MCNC: 1.73 MG/DL — HIGH (ref 0.5–1.3)
CREAT SERPL-MCNC: 1.77 MG/DL — HIGH (ref 0.5–1.3)
EGFR: 37 ML/MIN/1.73M2 — LOW
EGFR: 38 ML/MIN/1.73M2 — LOW
EOSINOPHIL # BLD AUTO: 0.01 K/UL — SIGNIFICANT CHANGE UP (ref 0–0.5)
EOSINOPHIL # BLD AUTO: 0.01 K/UL — SIGNIFICANT CHANGE UP (ref 0–0.5)
EOSINOPHIL NFR BLD AUTO: 0.1 % — SIGNIFICANT CHANGE UP (ref 0–6)
EOSINOPHIL NFR BLD AUTO: 0.1 % — SIGNIFICANT CHANGE UP (ref 0–6)
FIBRINOGEN PPP-MCNC: 599 MG/DL — HIGH (ref 258–438)
GLUCOSE SERPL-MCNC: 94 MG/DL — SIGNIFICANT CHANGE UP (ref 70–99)
GLUCOSE SERPL-MCNC: 98 MG/DL — SIGNIFICANT CHANGE UP (ref 70–99)
HCT VFR BLD CALC: 25.3 % — LOW (ref 34.5–45)
HCT VFR BLD CALC: 27 % — LOW (ref 34.5–45)
HCT VFR BLD CALC: 27.3 % — LOW (ref 34.5–45)
HGB BLD-MCNC: 8.4 G/DL — LOW (ref 11.5–15.5)
HGB BLD-MCNC: 8.9 G/DL — LOW (ref 11.5–15.5)
HGB BLD-MCNC: 9 G/DL — LOW (ref 11.5–15.5)
IMM GRANULOCYTES NFR BLD AUTO: 0.4 % — SIGNIFICANT CHANGE UP (ref 0–1.5)
IMM GRANULOCYTES NFR BLD AUTO: 1.1 % — SIGNIFICANT CHANGE UP (ref 0–1.5)
INR BLD: 1.08 — SIGNIFICANT CHANGE UP (ref 0.88–1.16)
LYMPHOCYTES # BLD AUTO: 1.05 K/UL — SIGNIFICANT CHANGE UP (ref 1–3.3)
LYMPHOCYTES # BLD AUTO: 1.33 K/UL — SIGNIFICANT CHANGE UP (ref 1–3.3)
LYMPHOCYTES # BLD AUTO: 7.5 % — LOW (ref 13–44)
LYMPHOCYTES # BLD AUTO: 9.9 % — LOW (ref 13–44)
MAGNESIUM SERPL-MCNC: 2.2 MG/DL — SIGNIFICANT CHANGE UP (ref 1.6–2.6)
MAGNESIUM SERPL-MCNC: 2.3 MG/DL — SIGNIFICANT CHANGE UP (ref 1.6–2.6)
MCHC RBC-ENTMCNC: 29.5 PG — SIGNIFICANT CHANGE UP (ref 27–34)
MCHC RBC-ENTMCNC: 29.9 PG — SIGNIFICANT CHANGE UP (ref 27–34)
MCHC RBC-ENTMCNC: 30.4 PG — SIGNIFICANT CHANGE UP (ref 27–34)
MCHC RBC-ENTMCNC: 32.6 GM/DL — SIGNIFICANT CHANGE UP (ref 32–36)
MCHC RBC-ENTMCNC: 33.2 GM/DL — SIGNIFICANT CHANGE UP (ref 32–36)
MCHC RBC-ENTMCNC: 33.3 GM/DL — SIGNIFICANT CHANGE UP (ref 32–36)
MCV RBC AUTO: 90 FL — SIGNIFICANT CHANGE UP (ref 80–100)
MCV RBC AUTO: 90.4 FL — SIGNIFICANT CHANGE UP (ref 80–100)
MCV RBC AUTO: 91.2 FL — SIGNIFICANT CHANGE UP (ref 80–100)
MONOCYTES # BLD AUTO: 1 K/UL — HIGH (ref 0–0.9)
MONOCYTES # BLD AUTO: 1.1 K/UL — HIGH (ref 0–0.9)
MONOCYTES NFR BLD AUTO: 7.4 % — SIGNIFICANT CHANGE UP (ref 2–14)
MONOCYTES NFR BLD AUTO: 7.9 % — SIGNIFICANT CHANGE UP (ref 2–14)
NEUTROPHILS # BLD AUTO: 11.02 K/UL — HIGH (ref 1.8–7.4)
NEUTROPHILS # BLD AUTO: 11.63 K/UL — HIGH (ref 1.8–7.4)
NEUTROPHILS NFR BLD AUTO: 82.1 % — HIGH (ref 43–77)
NEUTROPHILS NFR BLD AUTO: 83.3 % — HIGH (ref 43–77)
NRBC # BLD: 0 /100 WBCS — SIGNIFICANT CHANGE UP (ref 0–0)
OSMOLALITY UR: 94 MOSM/KG — LOW (ref 300–900)
PHOSPHATE SERPL-MCNC: 2.8 MG/DL — SIGNIFICANT CHANGE UP (ref 2.5–4.5)
PHOSPHATE SERPL-MCNC: 3.5 MG/DL — SIGNIFICANT CHANGE UP (ref 2.5–4.5)
PLATELET # BLD AUTO: 128 K/UL — LOW (ref 150–400)
PLATELET # BLD AUTO: 134 K/UL — LOW (ref 150–400)
PLATELET # BLD AUTO: 136 K/UL — LOW (ref 150–400)
POTASSIUM SERPL-MCNC: 4.5 MMOL/L — SIGNIFICANT CHANGE UP (ref 3.5–5.3)
POTASSIUM SERPL-MCNC: 4.9 MMOL/L — SIGNIFICANT CHANGE UP (ref 3.5–5.3)
POTASSIUM SERPL-SCNC: 4.5 MMOL/L — SIGNIFICANT CHANGE UP (ref 3.5–5.3)
POTASSIUM SERPL-SCNC: 4.9 MMOL/L — SIGNIFICANT CHANGE UP (ref 3.5–5.3)
POTASSIUM UR-SCNC: 6 MMOL/L — SIGNIFICANT CHANGE UP
PROT SERPL-MCNC: 5.3 G/DL — LOW (ref 6–8.3)
PROTHROM AB SERPL-ACNC: 12.9 SEC — SIGNIFICANT CHANGE UP (ref 10.5–13.4)
RBC # BLD: 2.81 M/UL — LOW (ref 3.8–5.2)
RBC # BLD: 2.96 M/UL — LOW (ref 3.8–5.2)
RBC # BLD: 3.02 M/UL — LOW (ref 3.8–5.2)
RBC # FLD: 14.3 % — SIGNIFICANT CHANGE UP (ref 10.3–14.5)
RBC # FLD: 14.4 % — SIGNIFICANT CHANGE UP (ref 10.3–14.5)
RBC # FLD: 14.5 % — SIGNIFICANT CHANGE UP (ref 10.3–14.5)
SARS-COV-2 RNA SPEC QL NAA+PROBE: NEGATIVE — SIGNIFICANT CHANGE UP
SODIUM SERPL-SCNC: 131 MMOL/L — LOW (ref 135–145)
SODIUM SERPL-SCNC: 135 MMOL/L — SIGNIFICANT CHANGE UP (ref 135–145)
SODIUM UR-SCNC: <20 MMOL/L — SIGNIFICANT CHANGE UP
WBC # BLD: 12.77 K/UL — HIGH (ref 3.8–10.5)
WBC # BLD: 13.44 K/UL — HIGH (ref 3.8–10.5)
WBC # BLD: 13.97 K/UL — HIGH (ref 3.8–10.5)
WBC # FLD AUTO: 12.77 K/UL — HIGH (ref 3.8–10.5)
WBC # FLD AUTO: 13.44 K/UL — HIGH (ref 3.8–10.5)
WBC # FLD AUTO: 13.97 K/UL — HIGH (ref 3.8–10.5)

## 2022-08-04 PROCEDURE — 99447 NTRPROF PH1/NTRNET/EHR 11-20: CPT

## 2022-08-04 PROCEDURE — 99223 1ST HOSP IP/OBS HIGH 75: CPT

## 2022-08-04 PROCEDURE — 36247 INS CATH ABD/L-EXT ART 3RD: CPT | Mod: 59

## 2022-08-04 PROCEDURE — 37244 VASC EMBOLIZE/OCCLUDE BLEED: CPT

## 2022-08-04 RX ORDER — SODIUM CHLORIDE 9 MG/ML
1000 INJECTION, SOLUTION INTRAVENOUS
Refills: 0 | Status: DISCONTINUED | OUTPATIENT
Start: 2022-08-04 | End: 2022-08-06

## 2022-08-04 RX ORDER — SODIUM CHLORIDE 9 MG/ML
1000 INJECTION INTRAMUSCULAR; INTRAVENOUS; SUBCUTANEOUS
Refills: 0 | Status: DISCONTINUED | OUTPATIENT
Start: 2022-08-04 | End: 2022-08-04

## 2022-08-04 RX ORDER — DIPHENHYDRAMINE HCL 50 MG
25 CAPSULE ORAL ONCE
Refills: 0 | Status: COMPLETED | OUTPATIENT
Start: 2022-08-04 | End: 2022-08-04

## 2022-08-04 RX ORDER — DIPHENHYDRAMINE HCL 50 MG
25 CAPSULE ORAL ONCE
Refills: 0 | Status: DISCONTINUED | OUTPATIENT
Start: 2022-08-04 | End: 2022-08-04

## 2022-08-04 RX ADMIN — Medication 1000 MILLIGRAM(S): at 19:08

## 2022-08-04 RX ADMIN — Medication 25 MILLIGRAM(S): at 09:05

## 2022-08-04 RX ADMIN — SIMETHICONE 80 MILLIGRAM(S): 80 TABLET, CHEWABLE ORAL at 11:02

## 2022-08-04 RX ADMIN — SIMETHICONE 80 MILLIGRAM(S): 80 TABLET, CHEWABLE ORAL at 23:22

## 2022-08-04 RX ADMIN — Medication 1000 MILLIGRAM(S): at 02:01

## 2022-08-04 RX ADMIN — Medication 25 GRAM(S): at 00:21

## 2022-08-04 RX ADMIN — SIMETHICONE 80 MILLIGRAM(S): 80 TABLET, CHEWABLE ORAL at 06:05

## 2022-08-04 RX ADMIN — OXYCODONE HYDROCHLORIDE 10 MILLIGRAM(S): 5 TABLET ORAL at 21:06

## 2022-08-04 RX ADMIN — Medication 1000 MILLIGRAM(S): at 03:01

## 2022-08-04 RX ADMIN — OXYCODONE HYDROCHLORIDE 10 MILLIGRAM(S): 5 TABLET ORAL at 03:01

## 2022-08-04 RX ADMIN — OXYCODONE HYDROCHLORIDE 10 MILLIGRAM(S): 5 TABLET ORAL at 06:05

## 2022-08-04 RX ADMIN — OXYCODONE HYDROCHLORIDE 10 MILLIGRAM(S): 5 TABLET ORAL at 18:18

## 2022-08-04 RX ADMIN — Medication 1000 MILLIGRAM(S): at 20:08

## 2022-08-04 RX ADMIN — OXYCODONE HYDROCHLORIDE 10 MILLIGRAM(S): 5 TABLET ORAL at 07:05

## 2022-08-04 RX ADMIN — OXYCODONE HYDROCHLORIDE 10 MILLIGRAM(S): 5 TABLET ORAL at 17:18

## 2022-08-04 RX ADMIN — OXYCODONE HYDROCHLORIDE 10 MILLIGRAM(S): 5 TABLET ORAL at 22:06

## 2022-08-04 RX ADMIN — HYDROMORPHONE HYDROCHLORIDE 0.5 MILLIGRAM(S): 2 INJECTION INTRAMUSCULAR; INTRAVENOUS; SUBCUTANEOUS at 00:15

## 2022-08-04 RX ADMIN — Medication 1000 MILLIGRAM(S): at 07:16

## 2022-08-04 RX ADMIN — IRON SUCROSE 176.67 MILLIGRAM(S): 20 INJECTION, SOLUTION INTRAVENOUS at 17:19

## 2022-08-04 RX ADMIN — SIMETHICONE 80 MILLIGRAM(S): 80 TABLET, CHEWABLE ORAL at 18:06

## 2022-08-04 RX ADMIN — SODIUM CHLORIDE 180 MILLILITER(S): 9 INJECTION INTRAMUSCULAR; INTRAVENOUS; SUBCUTANEOUS at 11:45

## 2022-08-04 RX ADMIN — SODIUM CHLORIDE 180 MILLILITER(S): 9 INJECTION INTRAMUSCULAR; INTRAVENOUS; SUBCUTANEOUS at 17:19

## 2022-08-04 RX ADMIN — OXYCODONE HYDROCHLORIDE 10 MILLIGRAM(S): 5 TABLET ORAL at 02:01

## 2022-08-04 RX ADMIN — Medication 1000 MILLIGRAM(S): at 08:16

## 2022-08-04 RX ADMIN — SODIUM CHLORIDE 180 MILLILITER(S): 9 INJECTION, SOLUTION INTRAVENOUS at 07:45

## 2022-08-04 NOTE — CONSULT NOTE ADULT - SUBJECTIVE AND OBJECTIVE BOX
37F pmh fibroids, menorrhagia, mild asthma, presented for myomectomy. POD2 abdominal myomectomy 957 fibroids emoved) with large EBL (2L) and 6 hours open time, with downtrending hgb, tachy. Also with new prerenal VICK. S/p 2 units PRBC (one overnight, one unit this morning). IR consulted for UAE.     Clinical History: D25.0 D25.1 N92.0    Handoff    MEWS Score    Fibroids    Fibroid    Asthma    Anemia    Fibroid    Fibroids    Abdominal myomectomy    H/O myomectomy    H/O repair of rotator cuff    Elective surgery    [id number]        Meds:acetaminophen     Tablet .. 1000 milliGRAM(s) Oral every 6 hours PRN  acetaminophen     Tablet .. 1000 milliGRAM(s) Oral every 6 hours  iron sucrose IVPB 300 milliGRAM(s) IV Intermittent every 24 hours  metoclopramide Injectable 10 milliGRAM(s) IV Push every 6 hours PRN  ondansetron Injectable 8 milliGRAM(s) IV Push every 6 hours PRN  oxyCODONE    IR 5 milliGRAM(s) Oral every 3 hours PRN  oxyCODONE    IR 10 milliGRAM(s) Oral every 4 hours PRN  simethicone 80 milliGRAM(s) Chew every 6 hours  sodium chloride 0.9%. 1000 milliLiter(s) IV Continuous <Continuous>      Allergies:apple (Unknown)  Bananas (Unknown)  Kiwi (Unknown)  peanuts (Unknown)  tetracycline (Unknown)        Labs:                           8.9    13.97 )-----------( 128      ( 04 Aug 2022 12:07 )             27.3     PT/INR - ( 04 Aug 2022 12:07 )   PT: 12.9 sec;   INR: 1.08          PTT - ( 04 Aug 2022 12:07 )  PTT:24.1 sec  08-04    135  |  104  |  18  ----------------------------<  94  4.9   |  21<L>  |  1.73<H>    Ca    7.3<L>      04 Aug 2022 12:07  Phos  2.8     08-04  Mg     2.3     08-04    TPro  5.3<L>  /  Alb  2.8<L>  /  TBili  1.3<H>  /  DBili  x   /  AST  47<H>  /  ALT  17  /  AlkPhos  62  08-04          Imaging Findings: reviewed (pre-op June 2022 MRI at [facility])

## 2022-08-04 NOTE — CONSULT NOTE ADULT - ASSESSMENT
37F hx of mild asthma, fibroid uterus s/p myomectomy with 55 fibroid removal. Nephrology consulted for VICK post procedure.     #nonoliguric VICK  Pt post op for myomectomy wit 2L blood loss total. Pt received 2.5L crystalloids in OR, 700ccs rbcs via cell saver, 1.5 L IVF LR 8/3, and has been on maintenance NS 180cc/hr. Pt also receiving 1u prbcs today. Pt urine studies consistent with likely pre-renal etiology however cannot exclude hypoperfusion related ATN as pt has had a significant Hgb since admission and lost 2L in OR.   - Obtain U/A, urine lytes repeat after blood products are received   - Obtain renal ultrasound   - Trend Hgb given blood loss in OR  - Strict I/Os   - Avoid nephrotoxic drugs   - Renally dose medications     Recommendations not finalized until attested by attending.    37F hx of mild asthma, fibroid uterus s/p myomectomy with 55 fibroid removal. Nephrology consulted for VICK post procedure.     #nonoliguric VICK  Pt post op for myomectomy wit 2L blood loss total. Baseline Cr upon presentation 1.3, however in was 0.7 in 2021. Pt received 2.5L crystalloids in OR, 700ccs rbcs via cell saver, 1.5 L IVF LR 8/3, and has been on maintenance NS 180cc/hr. Pt also receiving 1u prbcs today. Pt urine studies consistent with likely pre-renal etiology however cannot exclude hypoperfusion related ATN as pt has had a significant Hgb drop since admission from either 2L prbc loss in OR vs possible continued bleeding from uterine artery.   - Obtain U/A, urine lytes repeat after blood products are received   - Obtain renal ultrasound   - Trend Hgb given blood loss in OR  - Strict I/Os   - Avoid nephrotoxic drugs   - Renally dose medications     Recommendations not finalized until attested by attending.

## 2022-08-04 NOTE — PROGRESS NOTE ADULT - ASSESSMENT
37y y.o. s/p abdominal myomectomy of 55 fibroids, entered cavity, c/b prerenal VICK.   Neuro: Tylenol, Motrin, Oxy PRN  Pulm: RA, ISS  Cards: LR @180cc/hr- s/p 2500 cc bolus 8/3-4  GI: CLD, -/-, consider advancing diet  : Jones, not adequatly voiding, Prerenal. Continue high maintainance at 180/h, keep jones for now, nephro consult  DVT: SCD 37y y.o. s/p abdominal myomectomy of 57 fibroids, entered cavity, c/b prerenal VICK.   Neuro: Tylenol, Motrin, Oxy PRN  Pulm: RA, ISS  Cards: LR @180cc/hr- s/p 2500 cc bolus 8/3-4  GI: CLD, -/-, consider advancing diet  : Jones, not adequatly voiding, Prerenal. Continue high maintainance at 180/h, keep jones for now, nephro consult  DVT: SCD

## 2022-08-04 NOTE — CONSULT NOTE ADULT - ATTENDING COMMENTS
VICK post op with low ur na, persistent tachycardia (despite no pain) -- suspect hypoperfusion -- r/o active bleeding. cont supportive care with PRBC and IVF   check renal sono to r/o hydro   follow uo, chem q12 and follow cbc

## 2022-08-04 NOTE — CONSULT NOTE ADULT - SUBJECTIVE AND OBJECTIVE BOX
*** INCOMPLETE ***    Patient is a 37y old  Female who presents with a chief complaint of Scheduled abdominal myomectomy (04 Aug 2022 06:57)      HPI/HOSPITAL COURSE:  HPI:  Patient is a 37y . P0 presents for scheduled abdominal myomectomy.  Patient has a h/o of fibroids with menorrhagia, mild asthma. Denies chest pain, palpitations, SOB, n/v, diarrhea, constipation.    Ob hx: G0  Gyn hx: Fibroids  PMH: Mild asthma, never intubated/admitted  Meds: PO iron  PSH: Denies  Allergies: Tetracyclines - rash    Physical Exam  Gen: Well-appearing. NAD.  Resp: Breathing comfortably on RA.   Abd: Soft, non-tender, non-distended. 20 wk size uterus.  Ext: No calf tenderness (02 Aug 2022 20:33)        INTERVAL EVENTS:    SUBJECTIVE HPI: Patient seen and examined at bedside. Patient resting comfortably, no complaints at this time. Patient denies: fever, chills, weakness, dizziness, headaches, changes in vision, chest pain, palpitations, shortness of breath, cough, N/V, diarrhea or constipation, dysuria, LE edema. ROS otherwise negative.      PAST MEDICAL & SURGICAL HISTORY:  Fibroids  uterine      Asthma      Anemia      H/O myomectomy  laparoscopic      H/O repair of rotator cuff  right      Elective surgery  laparoscopy          FAMILY HISTORY:      SOCIAL HISTORY:     -Cigarrettes:     -Alcohol:     -Ilicit Drug Use:    Home Medications:  Vitamin D3 25 mcg (1000 intl units) oral capsule: 1 cap(s) orally once a day (02 Aug 2022 12:30)      MEDICATIONS  (STANDING):  acetaminophen     Tablet .. 1000 milliGRAM(s) Oral every 6 hours  iron sucrose IVPB 300 milliGRAM(s) IV Intermittent every 24 hours  simethicone 80 milliGRAM(s) Chew every 6 hours  sodium chloride 0.9%. 1000 milliLiter(s) (180 mL/Hr) IV Continuous <Continuous>    MEDICATIONS  (PRN):  acetaminophen     Tablet .. 1000 milliGRAM(s) Oral every 6 hours PRN Mild Pain (1 - 3)  metoclopramide Injectable 10 milliGRAM(s) IV Push every 6 hours PRN nausea or vomitting  ondansetron Injectable 8 milliGRAM(s) IV Push every 6 hours PRN Nausea and/or Vomiting  oxyCODONE    IR 5 milliGRAM(s) Oral every 3 hours PRN Moderate Pain (4 - 6)  oxyCODONE    IR 10 milliGRAM(s) Oral every 4 hours PRN Severe Pain (7 - 10)      VITAL SIGNS:  Vital Signs Last 24 Hrs  T(C): 36.8 (04 Aug 2022 08:35), Max: 37.7 (03 Aug 2022 16:08)  T(F): 98.3 (04 Aug 2022 08:35), Max: 99.8 (03 Aug 2022 16:08)  HR: 130 (04 Aug 2022 08:35) (110 - 131)  BP: 119/62 (04 Aug 2022 08:35) (99/56 - 119/74)  BP(mean): --  RR: 20 (04 Aug 2022 08:35) (16 - 20)  SpO2: 95% (04 Aug 2022 08:35) (95% - 97%)    Parameters below as of 04 Aug 2022 08:35  Patient On (Oxygen Delivery Method): room air        I&O's Detail    03 Aug 2022 07:01  -  04 Aug 2022 07:00  --------------------------------------------------------  IN:  Total IN: 0 mL    OUT:    Indwelling Catheter - Urethral (mL): 845 mL  Total OUT: 845 mL    Total NET: -845 mL          PHYSICAL EXAM:  General: Comfortable, pleasant/anxious/agitated, Ill-appearing, well-nourished/frail/cachectic, comfortable / in distress  Neurological: AAOx3, no focal deficits  HEENT: NC/AT; EOMI, PERRL, clear conjunctiva, no nasal or oropharyngeal discharge or exudates, MMM  Neck: supple, no cervical or post-auricular lymphadenopathy  Cardiovascular: +S1/S2, no murmurs/rubs/gallops, RRR  Respiratory: CTA B/L, no diminished breath sounds, no wheezes/rales/rhonchi, no increased work of breathing or accessory muscle use  Gastrointestinal: soft, NT/ND; active BSx4 quadrants  Genitourinary: no suprapubic tenderness, no CVA tenderness  Extremities: WWP; no edema, clubbing or cyanosis  Vascular: 2+ radial, DP/PT pulses B/L  Skin: no rashes  Lines/Drains:     LABS:                        8.4    13.44 )-----------( 136      ( 04 Aug 2022 05:30 )             25.3     08-04    131<L>  |  101  |  21  ----------------------------<  98  4.5   |  21<L>  |  1.77<H>    Ca    7.0<L>      04 Aug 2022 05:30  Phos  3.5     08-04  Mg     2.2     08-04    TPro  x   /  Alb  2.9<L>  /  TBili  x   /  DBili  x   /  AST  x   /  ALT  x   /  AlkPhos  x   08-03    PT/INR - ( 03 Aug 2022 03:55 )   PT: 13.3 sec;   INR: 1.12          PTT - ( 03 Aug 2022 03:55 )  PTT:25.4 sec        BNP            Microbiology:        RADIOLOGY & ADDITIONAL STUDIES: Reviewed.   *** INCOMPLETE ***    Patient is a 37y old  Female who presents with a chief complaint of Scheduled abdominal myomectomy (04 Aug 2022 06:57)      HPI/HOSPITAL COURSE:  HPI:  Patient is a 37y . P0 presents for scheduled abdominal myomectomy.  Patient has a h/o of fibroids with menorrhagia, mild asthma. Denies chest pain, palpitations, SOB, n/v, diarrhea, constipation.    Ob hx: G0  Gyn hx: Fibroids  PMH: Mild asthma, never intubated/admitted  Meds: PO iron  PSH: Denies  Allergies: Tetracyclines - rash    Physical Exam  Gen: Well-appearing. NAD.  Resp: Breathing comfortably on RA.   Abd: Soft, non-tender, non-distended. 20 wk size uterus.  Ext: No calf tenderness (02 Aug 2022 20:33)    SUBJECTIVE HPI: Patient seen and examined at bedside. Patient resting comfortably, no complaints at this time. Post op from surgery not complaining of pain.       PAST MEDICAL & SURGICAL HISTORY:  Fibroids  uterine      Asthma      Anemia      H/O myomectomy  laparoscopic      H/O repair of rotator cuff  right      Elective surgery  laparoscopy      Home Medications:  Vitamin D3 25 mcg (1000 intl units) oral capsule: 1 cap(s) orally once a day (02 Aug 2022 12:30)      MEDICATIONS  (STANDING):  acetaminophen     Tablet .. 1000 milliGRAM(s) Oral every 6 hours  iron sucrose IVPB 300 milliGRAM(s) IV Intermittent every 24 hours  simethicone 80 milliGRAM(s) Chew every 6 hours  sodium chloride 0.9%. 1000 milliLiter(s) (180 mL/Hr) IV Continuous <Continuous>    MEDICATIONS  (PRN):  acetaminophen     Tablet .. 1000 milliGRAM(s) Oral every 6 hours PRN Mild Pain (1 - 3)  metoclopramide Injectable 10 milliGRAM(s) IV Push every 6 hours PRN nausea or vomitting  ondansetron Injectable 8 milliGRAM(s) IV Push every 6 hours PRN Nausea and/or Vomiting  oxyCODONE    IR 5 milliGRAM(s) Oral every 3 hours PRN Moderate Pain (4 - 6)  oxyCODONE    IR 10 milliGRAM(s) Oral every 4 hours PRN Severe Pain (7 - 10)      VITAL SIGNS:  Vital Signs Last 24 Hrs  T(C): 36.8 (04 Aug 2022 08:35), Max: 37.7 (03 Aug 2022 16:08)  T(F): 98.3 (04 Aug 2022 08:35), Max: 99.8 (03 Aug 2022 16:08)  HR: 130 (04 Aug 2022 08:35) (110 - 131)  BP: 119/62 (04 Aug 2022 08:35) (99/56 - 119/74)  BP(mean): --  RR: 20 (04 Aug 2022 08:35) (16 - 20)  SpO2: 95% (04 Aug 2022 08:35) (95% - 97%)    Parameters below as of 04 Aug 2022 08:35  Patient On (Oxygen Delivery Method): room air        I&O's Detail    03 Aug 2022 07:01  -  04 Aug 2022 07:00  --------------------------------------------------------  IN:  Total IN: 0 mL    OUT:    Indwelling Catheter - Urethral (mL): 845 mL  Total OUT: 845 mL    Total NET: -845 mL      PHYSICAL EXAM:  General: Comfortable, sitting up in bed.  Neurological: AAOx3, no focal deficits  HEENT: NC/AT;   Neck: supple, no cervical or post-auricular lymphadenopathy  Cardiovascular: +S1/S2, no murmurs/rubs/gallops, mildly tachycardic  Respiratory: CTA B/L, no increased work of breathing or accessory muscle use  Gastrointestinal: soft, NT/ND; active BSx4 quadrants  Genitourinary: no suprapubic tenderness, no CVA tenderness  Extremities: WWP; trace bilateral edema bilateral  Vascular: 2+ radial, DP/PT pulses B/L    LABS:                        8.4    13.44 )-----------( 136      ( 04 Aug 2022 05:30 )             25.3     08-04    131<L>  |  101  |  21  ----------------------------<  98  4.5   |  21<L>  |  1.77<H>    Ca    7.0<L>      04 Aug 2022 05:30  Phos  3.5     08-04  Mg     2.2     08-04    TPro  x   /  Alb  2.9<L>  /  TBili  x   /  DBili  x   /  AST  x   /  ALT  x   /  AlkPhos  x   08-03    PT/INR - ( 03 Aug 2022 03:55 )   PT: 13.3 sec;   INR: 1.12          PTT - ( 03 Aug 2022 03:55 )  PTT:25.4 sec        BNP            Microbiology:        RADIOLOGY & ADDITIONAL STUDIES: Reviewed.     Patient is a 37y old  Female who presents with a chief complaint of Scheduled abdominal myomectomy (04 Aug 2022 06:57)      HPI/HOSPITAL COURSE:  HPI:  Patient is a 37y . P0 presents for scheduled abdominal myomectomy.  Patient has a h/o of fibroids with menorrhagia, mild asthma. Denies chest pain, palpitations, SOB, n/v, diarrhea, constipation.    Ob hx: G0  Gyn hx: Fibroids  PMH: Mild asthma, never intubated/admitted  Meds: PO iron  PSH: Denies  Allergies: Tetracyclines - rash    Physical Exam  Gen: Well-appearing. NAD.  Resp: Breathing comfortably on RA.   Abd: Soft, non-tender, non-distended. 20 wk size uterus.  Ext: No calf tenderness (02 Aug 2022 20:33)    SUBJECTIVE HPI: Patient seen and examined at bedside. Patient resting comfortably, no complaints at this time. Post op from surgery not complaining of pain.       PAST MEDICAL & SURGICAL HISTORY:  Fibroids  uterine      Asthma      Anemia      H/O myomectomy  laparoscopic      H/O repair of rotator cuff  right      Elective surgery  laparoscopy      Home Medications:  Vitamin D3 25 mcg (1000 intl units) oral capsule: 1 cap(s) orally once a day (02 Aug 2022 12:30)      MEDICATIONS  (STANDING):  acetaminophen     Tablet .. 1000 milliGRAM(s) Oral every 6 hours  iron sucrose IVPB 300 milliGRAM(s) IV Intermittent every 24 hours  simethicone 80 milliGRAM(s) Chew every 6 hours  sodium chloride 0.9%. 1000 milliLiter(s) (180 mL/Hr) IV Continuous <Continuous>    MEDICATIONS  (PRN):  acetaminophen     Tablet .. 1000 milliGRAM(s) Oral every 6 hours PRN Mild Pain (1 - 3)  metoclopramide Injectable 10 milliGRAM(s) IV Push every 6 hours PRN nausea or vomitting  ondansetron Injectable 8 milliGRAM(s) IV Push every 6 hours PRN Nausea and/or Vomiting  oxyCODONE    IR 5 milliGRAM(s) Oral every 3 hours PRN Moderate Pain (4 - 6)  oxyCODONE    IR 10 milliGRAM(s) Oral every 4 hours PRN Severe Pain (7 - 10)      VITAL SIGNS:  Vital Signs Last 24 Hrs  T(C): 36.8 (04 Aug 2022 08:35), Max: 37.7 (03 Aug 2022 16:08)  T(F): 98.3 (04 Aug 2022 08:35), Max: 99.8 (03 Aug 2022 16:08)  HR: 130 (04 Aug 2022 08:35) (110 - 131)  BP: 119/62 (04 Aug 2022 08:35) (99/56 - 119/74)  BP(mean): --  RR: 20 (04 Aug 2022 08:35) (16 - 20)  SpO2: 95% (04 Aug 2022 08:35) (95% - 97%)    Parameters below as of 04 Aug 2022 08:35  Patient On (Oxygen Delivery Method): room air        I&O's Detail    03 Aug 2022 07:01  -  04 Aug 2022 07:00  --------------------------------------------------------  IN:  Total IN: 0 mL    OUT:    Indwelling Catheter - Urethral (mL): 845 mL  Total OUT: 845 mL    Total NET: -845 mL      PHYSICAL EXAM:  General: Comfortable, sitting up in bed.  Neurological: AAOx3, no focal deficits  HEENT: NC/AT;   Neck: supple, no cervical or post-auricular lymphadenopathy  Cardiovascular: +S1/S2, no murmurs/rubs/gallops, mildly tachycardic  Respiratory: CTA B/L, no increased work of breathing or accessory muscle use  Gastrointestinal: soft, NT/ND; active BSx4 quadrants  Genitourinary: no suprapubic tenderness, no CVA tenderness  Extremities: WWP; trace bilateral edema bilateral  Vascular: 2+ radial, DP/PT pulses B/L    LABS:                        8.4    13.44 )-----------( 136      ( 04 Aug 2022 05:30 )             25.3     08-04    131<L>  |  101  |  21  ----------------------------<  98  4.5   |  21<L>  |  1.77<H>    Ca    7.0<L>      04 Aug 2022 05:30  Phos  3.5     08-04  Mg     2.2     08-04    TPro  x   /  Alb  2.9<L>  /  TBili  x   /  DBili  x   /  AST  x   /  ALT  x   /  AlkPhos  x   08-03    PT/INR - ( 03 Aug 2022 03:55 )   PT: 13.3 sec;   INR: 1.12          PTT - ( 03 Aug 2022 03:55 )  PTT:25.4 sec        BNP            Microbiology:        RADIOLOGY & ADDITIONAL STUDIES: Reviewed.     Patient is a 37y old  Female who presents with a chief complaint of Scheduled abdominal myomectomy (04 Aug 2022 06:57)      HPI/HOSPITAL COURSE:  HPI:  Patient is a 37y . P0 presents for scheduled abdominal myomectomy.  Patient has a h/o of fibroids with menorrhagia, mild asthma. Denies chest pain, palpitations, SOB, n/v, diarrhea, constipation.-- rising crreatinine form 1.2 to 1.7 post op. requiring mult PRBC and IVF -tachycardic persistenttly     Ob hx: G0  Gyn hx: Fibroids  PMH: Mild asthma, never intubated/admitted  Meds: PO iron  PSH: Denies  Allergies: Tetracyclines - rash    Physical Exam  Gen: Well-appearing. NAD.  Resp: Breathing comfortably on RA.   Abd: Soft, non-tender, non-distended. 20 wk size uterus.  Ext: No calf tenderness (02 Aug 2022 20:33)    SUBJECTIVE HPI: Patient seen and examined at bedside. Patient resting comfortably, no complaints at this time. Post op from surgery not complaining of pain.       PAST MEDICAL & SURGICAL HISTORY:  Fibroids  uterine      Asthma      Anemia      H/O myomectomy  laparoscopic      H/O repair of rotator cuff  right      Elective surgery  laparoscopy      Home Medications:  Vitamin D3 25 mcg (1000 intl units) oral capsule: 1 cap(s) orally once a day (02 Aug 2022 12:30)      MEDICATIONS  (STANDING):  acetaminophen     Tablet .. 1000 milliGRAM(s) Oral every 6 hours  iron sucrose IVPB 300 milliGRAM(s) IV Intermittent every 24 hours  simethicone 80 milliGRAM(s) Chew every 6 hours  sodium chloride 0.9%. 1000 milliLiter(s) (180 mL/Hr) IV Continuous <Continuous>    MEDICATIONS  (PRN):  acetaminophen     Tablet .. 1000 milliGRAM(s) Oral every 6 hours PRN Mild Pain (1 - 3)  metoclopramide Injectable 10 milliGRAM(s) IV Push every 6 hours PRN nausea or vomitting  ondansetron Injectable 8 milliGRAM(s) IV Push every 6 hours PRN Nausea and/or Vomiting  oxyCODONE    IR 5 milliGRAM(s) Oral every 3 hours PRN Moderate Pain (4 - 6)  oxyCODONE    IR 10 milliGRAM(s) Oral every 4 hours PRN Severe Pain (7 - 10)      VITAL SIGNS:  Vital Signs Last 24 Hrs  T(C): 36.8 (04 Aug 2022 08:35), Max: 37.7 (03 Aug 2022 16:08)  T(F): 98.3 (04 Aug 2022 08:35), Max: 99.8 (03 Aug 2022 16:08)  HR: 130 (04 Aug 2022 08:35) (110 - 131)  BP: 119/62 (04 Aug 2022 08:35) (99/56 - 119/74)  BP(mean): --  RR: 20 (04 Aug 2022 08:35) (16 - 20)  SpO2: 95% (04 Aug 2022 08:35) (95% - 97%)    Parameters below as of 04 Aug 2022 08:35  Patient On (Oxygen Delivery Method): room air        I&O's Detail    03 Aug 2022 07:01  -  04 Aug 2022 07:00  --------------------------------------------------------  IN:  Total IN: 0 mL    OUT:    Indwelling Catheter - Urethral (mL): 845 mL  Total OUT: 845 mL    Total NET: -845 mL      PHYSICAL EXAM:  General: Comfortable, sitting up in bed.  Neurological: AAOx3, no focal deficits  HEENT: NC/AT;   Neck: supple, no cervical or post-auricular lymphadenopathy  Cardiovascular: +S1/S2, no murmurs/rubs/gallops, mildly tachycardic  Respiratory: CTA B/L, no increased work of breathing or accessory muscle use  Gastrointestinal: soft, NT/ND; active BSx4 quadrants  Genitourinary: no suprapubic tenderness, no CVA tenderness  Extremities: WWP; trace bilateral edema bilateral  Vascular: 2+ radial, DP/PT pulses B/L    LABS:                        8.4    13.44 )-----------( 136      ( 04 Aug 2022 05:30 )             25.3     08-04    131<L>  |  101  |  21  ----------------------------<  98  4.5   |  21<L>  |  1.77<H>    Ca    7.0<L>      04 Aug 2022 05:30  Phos  3.5     08-04  Mg     2.2     08-04    TPro  x   /  Alb  2.9<L>  /  TBili  x   /  DBili  x   /  AST  x   /  ALT  x   /  AlkPhos  x   08-03    PT/INR - ( 03 Aug 2022 03:55 )   PT: 13.3 sec;   INR: 1.12          PTT - ( 03 Aug 2022 03:55 )  PTT:25.4 sec        BNP            Microbiology:        RADIOLOGY & ADDITIONAL STUDIES: Reviewed.

## 2022-08-04 NOTE — CONSULT NOTE ADULT - ASSESSMENT
Assessment: 37F pmh fibroids, menorrhagia, mild asthma, presented for myomectomy. POD2 abdominal myomectomy 957 fibroids emoved) with large EBL (2L) and 6 hours open time, with downtrending hgb, tachy. Also with new prerenal VICK. S/p 2 units PRBC (one overnight, one unit this morning). IR consulted for UAE. Case reviewed with Dr. Duran, plan for procedure with sedation vs anesthesia pending post transfusion CBC ~12noon.     Recommendations: NPO at midnight prior to procedure with sedation (last clears intake around 10:30) . Please ensure Covid swab is up to date (within last 72 hours).    Communicated with: Dr. Mistry, Dr. Burkett gyn

## 2022-08-05 LAB
ALBUMIN SERPL ELPH-MCNC: 2.3 G/DL — LOW (ref 3.3–5)
ALBUMIN SERPL ELPH-MCNC: 2.5 G/DL — LOW (ref 3.3–5)
ALP SERPL-CCNC: 51 U/L — SIGNIFICANT CHANGE UP (ref 40–120)
ALP SERPL-CCNC: 53 U/L — SIGNIFICANT CHANGE UP (ref 40–120)
ALT FLD-CCNC: 15 U/L — SIGNIFICANT CHANGE UP (ref 10–45)
ALT FLD-CCNC: 15 U/L — SIGNIFICANT CHANGE UP (ref 10–45)
ANION GAP SERPL CALC-SCNC: 10 MMOL/L — SIGNIFICANT CHANGE UP (ref 5–17)
ANION GAP SERPL CALC-SCNC: 8 MMOL/L — SIGNIFICANT CHANGE UP (ref 5–17)
ANION GAP SERPL CALC-SCNC: 9 MMOL/L — SIGNIFICANT CHANGE UP (ref 5–17)
AST SERPL-CCNC: 36 U/L — SIGNIFICANT CHANGE UP (ref 10–40)
AST SERPL-CCNC: 39 U/L — SIGNIFICANT CHANGE UP (ref 10–40)
BASOPHILS # BLD AUTO: 0.02 K/UL — SIGNIFICANT CHANGE UP (ref 0–0.2)
BASOPHILS NFR BLD AUTO: 0.2 % — SIGNIFICANT CHANGE UP (ref 0–2)
BILIRUB SERPL-MCNC: 0.6 MG/DL — SIGNIFICANT CHANGE UP (ref 0.2–1.2)
BILIRUB SERPL-MCNC: 0.7 MG/DL — SIGNIFICANT CHANGE UP (ref 0.2–1.2)
BLD GP AB SCN SERPL QL: NEGATIVE — SIGNIFICANT CHANGE UP
BUN SERPL-MCNC: 14 MG/DL — SIGNIFICANT CHANGE UP (ref 7–23)
BUN SERPL-MCNC: 15 MG/DL — SIGNIFICANT CHANGE UP (ref 7–23)
BUN SERPL-MCNC: 15 MG/DL — SIGNIFICANT CHANGE UP (ref 7–23)
CALCIUM SERPL-MCNC: 7.4 MG/DL — LOW (ref 8.4–10.5)
CALCIUM SERPL-MCNC: 7.5 MG/DL — LOW (ref 8.4–10.5)
CALCIUM SERPL-MCNC: 7.5 MG/DL — LOW (ref 8.4–10.5)
CHLORIDE SERPL-SCNC: 109 MMOL/L — HIGH (ref 96–108)
CHLORIDE SERPL-SCNC: 110 MMOL/L — HIGH (ref 96–108)
CHLORIDE SERPL-SCNC: 110 MMOL/L — HIGH (ref 96–108)
CO2 SERPL-SCNC: 18 MMOL/L — LOW (ref 22–31)
CO2 SERPL-SCNC: 21 MMOL/L — LOW (ref 22–31)
CO2 SERPL-SCNC: 22 MMOL/L — SIGNIFICANT CHANGE UP (ref 22–31)
CREAT ?TM UR-MCNC: 58 MG/DL — SIGNIFICANT CHANGE UP
CREAT SERPL-MCNC: 1.33 MG/DL — HIGH (ref 0.5–1.3)
CREAT SERPL-MCNC: 1.36 MG/DL — HIGH (ref 0.5–1.3)
CREAT SERPL-MCNC: 1.43 MG/DL — HIGH (ref 0.5–1.3)
EGFR: 48 ML/MIN/1.73M2 — LOW
EGFR: 51 ML/MIN/1.73M2 — LOW
EGFR: 52 ML/MIN/1.73M2 — LOW
EOSINOPHIL # BLD AUTO: 0 K/UL — SIGNIFICANT CHANGE UP (ref 0–0.5)
EOSINOPHIL NFR BLD AUTO: 0 % — SIGNIFICANT CHANGE UP (ref 0–6)
GLUCOSE SERPL-MCNC: 107 MG/DL — HIGH (ref 70–99)
GLUCOSE SERPL-MCNC: 128 MG/DL — HIGH (ref 70–99)
GLUCOSE SERPL-MCNC: 135 MG/DL — HIGH (ref 70–99)
HCT VFR BLD CALC: 26.5 % — LOW (ref 34.5–45)
HGB BLD-MCNC: 9 G/DL — LOW (ref 11.5–15.5)
IMM GRANULOCYTES NFR BLD AUTO: 1.7 % — HIGH (ref 0–1.5)
LYMPHOCYTES # BLD AUTO: 0.58 K/UL — LOW (ref 1–3.3)
LYMPHOCYTES # BLD AUTO: 4.4 % — LOW (ref 13–44)
MAGNESIUM SERPL-MCNC: 2.5 MG/DL — SIGNIFICANT CHANGE UP (ref 1.6–2.6)
MAGNESIUM UR-MCNC: <1 MG/DL — SIGNIFICANT CHANGE UP
MCHC RBC-ENTMCNC: 30.5 PG — SIGNIFICANT CHANGE UP (ref 27–34)
MCHC RBC-ENTMCNC: 34 GM/DL — SIGNIFICANT CHANGE UP (ref 32–36)
MCV RBC AUTO: 89.8 FL — SIGNIFICANT CHANGE UP (ref 80–100)
MONOCYTES # BLD AUTO: 1.07 K/UL — HIGH (ref 0–0.9)
MONOCYTES NFR BLD AUTO: 8.2 % — SIGNIFICANT CHANGE UP (ref 2–14)
NEUTROPHILS # BLD AUTO: 11.2 K/UL — HIGH (ref 1.8–7.4)
NEUTROPHILS NFR BLD AUTO: 85.5 % — HIGH (ref 43–77)
NRBC # BLD: 0 /100 WBCS — SIGNIFICANT CHANGE UP (ref 0–0)
PHOSPHATE SERPL-MCNC: 1.8 MG/DL — LOW (ref 2.5–4.5)
PLATELET # BLD AUTO: 135 K/UL — LOW (ref 150–400)
POTASSIUM SERPL-MCNC: 3.9 MMOL/L — SIGNIFICANT CHANGE UP (ref 3.5–5.3)
POTASSIUM SERPL-MCNC: 4.7 MMOL/L — SIGNIFICANT CHANGE UP (ref 3.5–5.3)
POTASSIUM SERPL-MCNC: 4.8 MMOL/L — SIGNIFICANT CHANGE UP (ref 3.5–5.3)
POTASSIUM SERPL-SCNC: 3.9 MMOL/L — SIGNIFICANT CHANGE UP (ref 3.5–5.3)
POTASSIUM SERPL-SCNC: 4.7 MMOL/L — SIGNIFICANT CHANGE UP (ref 3.5–5.3)
POTASSIUM SERPL-SCNC: 4.8 MMOL/L — SIGNIFICANT CHANGE UP (ref 3.5–5.3)
PROT SERPL-MCNC: 5.2 G/DL — LOW (ref 6–8.3)
PROT SERPL-MCNC: 5.2 G/DL — LOW (ref 6–8.3)
RBC # BLD: 2.95 M/UL — LOW (ref 3.8–5.2)
RBC # FLD: 14.6 % — HIGH (ref 10.3–14.5)
RH IG SCN BLD-IMP: POSITIVE — SIGNIFICANT CHANGE UP
SODIUM SERPL-SCNC: 137 MMOL/L — SIGNIFICANT CHANGE UP (ref 135–145)
SODIUM SERPL-SCNC: 140 MMOL/L — SIGNIFICANT CHANGE UP (ref 135–145)
SODIUM SERPL-SCNC: 140 MMOL/L — SIGNIFICANT CHANGE UP (ref 135–145)
SODIUM UR-SCNC: 41 MMOL/L — SIGNIFICANT CHANGE UP
WBC # BLD: 13.09 K/UL — HIGH (ref 3.8–10.5)
WBC # FLD AUTO: 13.09 K/UL — HIGH (ref 3.8–10.5)

## 2022-08-05 PROCEDURE — 99232 SBSQ HOSP IP/OBS MODERATE 35: CPT

## 2022-08-05 PROCEDURE — 76770 US EXAM ABDO BACK WALL COMP: CPT | Mod: 26

## 2022-08-05 RX ORDER — DIPHENHYDRAMINE HCL 50 MG
25 CAPSULE ORAL ONCE
Refills: 0 | Status: COMPLETED | OUTPATIENT
Start: 2022-08-05 | End: 2022-08-05

## 2022-08-05 RX ORDER — POTASSIUM CHLORIDE 20 MEQ
10 PACKET (EA) ORAL ONCE
Refills: 0 | Status: COMPLETED | OUTPATIENT
Start: 2022-08-05 | End: 2022-08-06

## 2022-08-05 RX ADMIN — OXYCODONE HYDROCHLORIDE 10 MILLIGRAM(S): 5 TABLET ORAL at 23:15

## 2022-08-05 RX ADMIN — Medication 1000 MILLIGRAM(S): at 09:36

## 2022-08-05 RX ADMIN — Medication 1000 MILLIGRAM(S): at 20:03

## 2022-08-05 RX ADMIN — IRON SUCROSE 176.67 MILLIGRAM(S): 20 INJECTION, SOLUTION INTRAVENOUS at 19:02

## 2022-08-05 RX ADMIN — SIMETHICONE 80 MILLIGRAM(S): 80 TABLET, CHEWABLE ORAL at 13:20

## 2022-08-05 RX ADMIN — Medication 64.25 MILLIMOLE(S): at 10:44

## 2022-08-05 RX ADMIN — OXYCODONE HYDROCHLORIDE 10 MILLIGRAM(S): 5 TABLET ORAL at 18:08

## 2022-08-05 RX ADMIN — OXYCODONE HYDROCHLORIDE 10 MILLIGRAM(S): 5 TABLET ORAL at 11:48

## 2022-08-05 RX ADMIN — OXYCODONE HYDROCHLORIDE 10 MILLIGRAM(S): 5 TABLET ORAL at 10:48

## 2022-08-05 RX ADMIN — Medication 1000 MILLIGRAM(S): at 02:10

## 2022-08-05 RX ADMIN — Medication 1000 MILLIGRAM(S): at 14:17

## 2022-08-05 RX ADMIN — OXYCODONE HYDROCHLORIDE 10 MILLIGRAM(S): 5 TABLET ORAL at 22:15

## 2022-08-05 RX ADMIN — Medication 1000 MILLIGRAM(S): at 15:17

## 2022-08-05 RX ADMIN — Medication 1000 MILLIGRAM(S): at 03:10

## 2022-08-05 RX ADMIN — OXYCODONE HYDROCHLORIDE 10 MILLIGRAM(S): 5 TABLET ORAL at 19:08

## 2022-08-05 RX ADMIN — OXYCODONE HYDROCHLORIDE 10 MILLIGRAM(S): 5 TABLET ORAL at 04:57

## 2022-08-05 RX ADMIN — SIMETHICONE 80 MILLIGRAM(S): 80 TABLET, CHEWABLE ORAL at 05:51

## 2022-08-05 RX ADMIN — Medication 1000 MILLIGRAM(S): at 19:33

## 2022-08-05 RX ADMIN — Medication 25 MILLIGRAM(S): at 10:31

## 2022-08-05 RX ADMIN — Medication 1000 MILLIGRAM(S): at 08:36

## 2022-08-05 RX ADMIN — SIMETHICONE 80 MILLIGRAM(S): 80 TABLET, CHEWABLE ORAL at 18:07

## 2022-08-05 RX ADMIN — OXYCODONE HYDROCHLORIDE 10 MILLIGRAM(S): 5 TABLET ORAL at 05:57

## 2022-08-05 RX ADMIN — SIMETHICONE 80 MILLIGRAM(S): 80 TABLET, CHEWABLE ORAL at 23:09

## 2022-08-05 NOTE — PROGRESS NOTE ADULT - ASSESSMENT
37y y.o. s/p abdominal myomectomy of 55 fibroids, entered cavity, admitted for pain control and postoperative monitoring, c/b prerenal VICK  Neuro: Tylenol, Toradol, Oxy PRN  Pulm: RA, ISS  Cards: LR @140cc/hr  GI: LRD, -/-  : voiding  DVT: SCD  A+ 8/2    f/u AM labs

## 2022-08-05 NOTE — PROGRESS NOTE ADULT - ASSESSMENT
37F hx of mild asthma, fibroid uterus s/p myomectomy with 55 fibroid removal. Nephrology consulted for VICK post procedure.     #nonoliguric VICK most likely due to hypoperfusion related ATN as pt has had a significant Hgb drop since admission from either 2L prbc loss in OR vs possible continued bleeding from uterine artery.   Pt post op for myomectomy and s/p bilateral embolization of uterine arteries wit 2L blood loss total. Baseline Cr upon presentation 1.3, however in was 0.7 in 2021.   Pt urine studies consistent with likely pre-renal etiology   sCr now improving  today at 1.36 with adequate urine out put around 4L       Plan:  - Follow up renal ultrasound   - Discontinue IVF and continue to encourage PO in take   - Daily BMP   - Strict I/Os   - Avoid nephrotoxic drugs   - Renally dose medications     Hypophosphatemia   Continue to monitor and replete

## 2022-08-05 NOTE — PROGRESS NOTE ADULT - ATTENDING COMMENTS
Continue aggressive resuscitation of patient.   Will follow closely.   No plan or discharge given low UOP, tachycardia and other post op concerns.   Will follow closely.
VICK due to hypoperfusion resolving   can monitor off IVF
Agree with note.   No plan for discharge at this time
Pt will improving clinical condition after aggressive resuscitation and blood.   Kidney function improving.   Will follow closely  Plan for renal ultrasound today and avoidance of renally cleared medication  No plan for discharge as patient has not passed gas and renal issues still ongoing
Plan for consult with nephro to rule out any other renal related concerns.   Will continue hydration however also consider blood for ongoing tachycardia.         Agree with plan and will continue with active management of this patient and issue

## 2022-08-06 ENCOUNTER — TRANSCRIPTION ENCOUNTER (OUTPATIENT)
Age: 37
End: 2022-08-06

## 2022-08-06 VITALS
HEART RATE: 98 BPM | RESPIRATION RATE: 18 BRPM | OXYGEN SATURATION: 98 % | SYSTOLIC BLOOD PRESSURE: 138 MMHG | DIASTOLIC BLOOD PRESSURE: 89 MMHG | TEMPERATURE: 98 F

## 2022-08-06 LAB
ANION GAP SERPL CALC-SCNC: 7 MMOL/L — SIGNIFICANT CHANGE UP (ref 5–17)
BASOPHILS # BLD AUTO: 0.03 K/UL — SIGNIFICANT CHANGE UP (ref 0–0.2)
BASOPHILS NFR BLD AUTO: 0.3 % — SIGNIFICANT CHANGE UP (ref 0–2)
BUN SERPL-MCNC: 13 MG/DL — SIGNIFICANT CHANGE UP (ref 7–23)
CALCIUM SERPL-MCNC: 7.8 MG/DL — LOW (ref 8.4–10.5)
CHLORIDE SERPL-SCNC: 109 MMOL/L — HIGH (ref 96–108)
CO2 SERPL-SCNC: 22 MMOL/L — SIGNIFICANT CHANGE UP (ref 22–31)
CREAT SERPL-MCNC: 1.16 MG/DL — SIGNIFICANT CHANGE UP (ref 0.5–1.3)
EGFR: 62 ML/MIN/1.73M2 — SIGNIFICANT CHANGE UP
EOSINOPHIL # BLD AUTO: 0.18 K/UL — SIGNIFICANT CHANGE UP (ref 0–0.5)
EOSINOPHIL NFR BLD AUTO: 1.6 % — SIGNIFICANT CHANGE UP (ref 0–6)
GLUCOSE SERPL-MCNC: 81 MG/DL — SIGNIFICANT CHANGE UP (ref 70–99)
HCT VFR BLD CALC: 26.5 % — LOW (ref 34.5–45)
HGB BLD-MCNC: 8.8 G/DL — LOW (ref 11.5–15.5)
IMM GRANULOCYTES NFR BLD AUTO: 1.4 % — SIGNIFICANT CHANGE UP (ref 0–1.5)
LYMPHOCYTES # BLD AUTO: 1.77 K/UL — SIGNIFICANT CHANGE UP (ref 1–3.3)
LYMPHOCYTES # BLD AUTO: 15.7 % — SIGNIFICANT CHANGE UP (ref 13–44)
MAGNESIUM SERPL-MCNC: 2.4 MG/DL — SIGNIFICANT CHANGE UP (ref 1.6–2.6)
MCHC RBC-ENTMCNC: 30.7 PG — SIGNIFICANT CHANGE UP (ref 27–34)
MCHC RBC-ENTMCNC: 33.2 GM/DL — SIGNIFICANT CHANGE UP (ref 32–36)
MCV RBC AUTO: 92.3 FL — SIGNIFICANT CHANGE UP (ref 80–100)
MONOCYTES # BLD AUTO: 0.83 K/UL — SIGNIFICANT CHANGE UP (ref 0–0.9)
MONOCYTES NFR BLD AUTO: 7.4 % — SIGNIFICANT CHANGE UP (ref 2–14)
NEUTROPHILS # BLD AUTO: 8.29 K/UL — HIGH (ref 1.8–7.4)
NEUTROPHILS NFR BLD AUTO: 73.6 % — SIGNIFICANT CHANGE UP (ref 43–77)
NRBC # BLD: 0 /100 WBCS — SIGNIFICANT CHANGE UP (ref 0–0)
PHOSPHATE SERPL-MCNC: 1.9 MG/DL — LOW (ref 2.5–4.5)
PLATELET # BLD AUTO: 171 K/UL — SIGNIFICANT CHANGE UP (ref 150–400)
POTASSIUM SERPL-MCNC: 3.8 MMOL/L — SIGNIFICANT CHANGE UP (ref 3.5–5.3)
POTASSIUM SERPL-SCNC: 3.8 MMOL/L — SIGNIFICANT CHANGE UP (ref 3.5–5.3)
RBC # BLD: 2.87 M/UL — LOW (ref 3.8–5.2)
RBC # FLD: 15 % — HIGH (ref 10.3–14.5)
SODIUM SERPL-SCNC: 138 MMOL/L — SIGNIFICANT CHANGE UP (ref 135–145)
WBC # BLD: 11.26 K/UL — HIGH (ref 3.8–10.5)
WBC # FLD AUTO: 11.26 K/UL — HIGH (ref 3.8–10.5)

## 2022-08-06 PROCEDURE — 85384 FIBRINOGEN ACTIVITY: CPT

## 2022-08-06 PROCEDURE — 82040 ASSAY OF SERUM ALBUMIN: CPT

## 2022-08-06 PROCEDURE — 86900 BLOOD TYPING SEROLOGIC ABO: CPT

## 2022-08-06 PROCEDURE — 82570 ASSAY OF URINE CREATININE: CPT

## 2022-08-06 PROCEDURE — P9016: CPT

## 2022-08-06 PROCEDURE — 80048 BASIC METABOLIC PNL TOTAL CA: CPT

## 2022-08-06 PROCEDURE — 80053 COMPREHEN METABOLIC PANEL: CPT

## 2022-08-06 PROCEDURE — 85610 PROTHROMBIN TIME: CPT

## 2022-08-06 PROCEDURE — 36247 INS CATH ABD/L-EXT ART 3RD: CPT

## 2022-08-06 PROCEDURE — 37244 VASC EMBOLIZE/OCCLUDE BLEED: CPT

## 2022-08-06 PROCEDURE — 76770 US EXAM ABDO BACK WALL COMP: CPT

## 2022-08-06 PROCEDURE — 85025 COMPLETE CBC W/AUTO DIFF WBC: CPT

## 2022-08-06 PROCEDURE — 84300 ASSAY OF URINE SODIUM: CPT

## 2022-08-06 PROCEDURE — C1769: CPT

## 2022-08-06 PROCEDURE — 86901 BLOOD TYPING SEROLOGIC RH(D): CPT

## 2022-08-06 PROCEDURE — 83935 ASSAY OF URINE OSMOLALITY: CPT

## 2022-08-06 PROCEDURE — 84133 ASSAY OF URINE POTASSIUM: CPT

## 2022-08-06 PROCEDURE — 88305 TISSUE EXAM BY PATHOLOGIST: CPT

## 2022-08-06 PROCEDURE — 86850 RBC ANTIBODY SCREEN: CPT

## 2022-08-06 PROCEDURE — 86923 COMPATIBILITY TEST ELECTRIC: CPT

## 2022-08-06 PROCEDURE — 36415 COLL VENOUS BLD VENIPUNCTURE: CPT

## 2022-08-06 PROCEDURE — 82436 ASSAY OF URINE CHLORIDE: CPT

## 2022-08-06 PROCEDURE — C1894: CPT

## 2022-08-06 PROCEDURE — C9399: CPT

## 2022-08-06 PROCEDURE — 85027 COMPLETE CBC AUTOMATED: CPT

## 2022-08-06 PROCEDURE — C1889: CPT

## 2022-08-06 PROCEDURE — 36430 TRANSFUSION BLD/BLD COMPNT: CPT

## 2022-08-06 PROCEDURE — 83735 ASSAY OF MAGNESIUM: CPT

## 2022-08-06 PROCEDURE — 87635 SARS-COV-2 COVID-19 AMP PRB: CPT

## 2022-08-06 PROCEDURE — 84100 ASSAY OF PHOSPHORUS: CPT

## 2022-08-06 PROCEDURE — C1765: CPT

## 2022-08-06 PROCEDURE — 85730 THROMBOPLASTIN TIME PARTIAL: CPT

## 2022-08-06 PROCEDURE — C1887: CPT

## 2022-08-06 PROCEDURE — 82962 GLUCOSE BLOOD TEST: CPT

## 2022-08-06 RX ORDER — CHOLECALCIFEROL (VITAMIN D3) 125 MCG
1 CAPSULE ORAL
Qty: 0 | Refills: 0 | DISCHARGE

## 2022-08-06 RX ORDER — ACETAMINOPHEN 500 MG
2 TABLET ORAL
Qty: 0 | Refills: 0 | DISCHARGE
Start: 2022-08-06

## 2022-08-06 RX ORDER — OXYCODONE HYDROCHLORIDE 5 MG/1
1 TABLET ORAL
Qty: 20 | Refills: 0
Start: 2022-08-06

## 2022-08-06 RX ORDER — POTASSIUM CHLORIDE 20 MEQ
20 PACKET (EA) ORAL ONCE
Refills: 0 | Status: COMPLETED | OUTPATIENT
Start: 2022-08-06 | End: 2022-08-06

## 2022-08-06 RX ORDER — OXYCODONE HYDROCHLORIDE 5 MG/1
1 TABLET ORAL
Qty: 0 | Refills: 0 | DISCHARGE
Start: 2022-08-06

## 2022-08-06 RX ADMIN — Medication 1000 MILLIGRAM(S): at 09:15

## 2022-08-06 RX ADMIN — Medication 1000 MILLIGRAM(S): at 03:33

## 2022-08-06 RX ADMIN — Medication 1000 MILLIGRAM(S): at 04:33

## 2022-08-06 RX ADMIN — OXYCODONE HYDROCHLORIDE 10 MILLIGRAM(S): 5 TABLET ORAL at 13:01

## 2022-08-06 RX ADMIN — SIMETHICONE 80 MILLIGRAM(S): 80 TABLET, CHEWABLE ORAL at 07:55

## 2022-08-06 RX ADMIN — OXYCODONE HYDROCHLORIDE 10 MILLIGRAM(S): 5 TABLET ORAL at 04:33

## 2022-08-06 RX ADMIN — Medication 10 MILLIEQUIVALENT(S): at 00:07

## 2022-08-06 RX ADMIN — Medication 25 MILLIGRAM(S): at 00:07

## 2022-08-06 RX ADMIN — OXYCODONE HYDROCHLORIDE 10 MILLIGRAM(S): 5 TABLET ORAL at 10:00

## 2022-08-06 RX ADMIN — OXYCODONE HYDROCHLORIDE 10 MILLIGRAM(S): 5 TABLET ORAL at 09:00

## 2022-08-06 RX ADMIN — Medication 20 MILLIEQUIVALENT(S): at 06:28

## 2022-08-06 RX ADMIN — SIMETHICONE 80 MILLIGRAM(S): 80 TABLET, CHEWABLE ORAL at 12:00

## 2022-08-06 RX ADMIN — Medication 1000 MILLIGRAM(S): at 13:31

## 2022-08-06 RX ADMIN — Medication 1000 MILLIGRAM(S): at 13:02

## 2022-08-06 RX ADMIN — OXYCODONE HYDROCHLORIDE 10 MILLIGRAM(S): 5 TABLET ORAL at 13:31

## 2022-08-06 RX ADMIN — OXYCODONE HYDROCHLORIDE 10 MILLIGRAM(S): 5 TABLET ORAL at 03:33

## 2022-08-06 RX ADMIN — Medication 1000 MILLIGRAM(S): at 08:15

## 2022-08-06 NOTE — PROGRESS NOTE ADULT - SUBJECTIVE AND OBJECTIVE BOX
Pt seen and examined at bedside. Pt states minimal abdominal pain. Pt is ambulating, tolerating LRD diet, passing flatus, and urinating adequately.   Pt denies fever, chills, chest pain, SOB, nausea, vomiting, lightheadedness, dizziness.      T(F): 98 (08-06-22 @ 08:51), Max: 98.8 (08-06-22 @ 05:07)  HR: 98 (08-06-22 @ 08:51) (95 - 106)  BP: 138/89 (08-06-22 @ 08:51) (109/70 - 145/84)  RR: 18 (08-06-22 @ 08:51) (16 - 18)  SpO2: 98% (08-06-22 @ 08:51) (96% - 98%)  I&O's Summary    05 Aug 2022 07:01  -  06 Aug 2022 07:00  --------------------------------------------------------  IN: 850 mL / OUT: 700 mL / NET: 150 mL    06 Aug 2022 07:01  -  06 Aug 2022 10:43  --------------------------------------------------------  IN: 320 mL / OUT: 500 mL / NET: -180 mL        MEDICATIONS  (STANDING):  acetaminophen     Tablet .. 1000 milliGRAM(s) Oral every 6 hours  iron sucrose IVPB 300 milliGRAM(s) IV Intermittent every 24 hours  simethicone 80 milliGRAM(s) Chew every 6 hours    MEDICATIONS  (PRN):  acetaminophen     Tablet .. 1000 milliGRAM(s) Oral every 6 hours PRN Mild Pain (1 - 3)  metoclopramide Injectable 10 milliGRAM(s) IV Push every 6 hours PRN nausea or vomitting  ondansetron Injectable 8 milliGRAM(s) IV Push every 6 hours PRN Nausea and/or Vomiting  oxyCODONE    IR 10 milliGRAM(s) Oral every 4 hours PRN Severe Pain (7 - 10)  oxyCODONE    IR 5 milliGRAM(s) Oral every 3 hours PRN Moderate Pain (4 - 6)      Physical Exam:  Constitutional: NAD  Pulmonary: no increased work of breathing  Cardiovascular: Regular rate and rhythm, tachycardia improve  Abdomen: incision site clean, dry, intact. Soft, minimally tender, nondistended, no guarding, no rebound, + bowel sounds  Extremities: no lower extremity edema or calf tenderness    LABS:                        8.8    11.26 )-----------( 171      ( 06 Aug 2022 05:30 )             26.5     08-06    138  |  109<H>  |  13  ----------------------------<  81  3.8   |  22  |  1.16    Ca    7.8<L>      06 Aug 2022 05:30  Phos  1.9     08-06  Mg     2.4     08-06    TPro  5.2<L>  /  Alb  2.5<L>  /  TBili  0.6  /  DBili  x   /  AST  36  /  ALT  15  /  AlkPhos  51  08-05    PT/INR - ( 04 Aug 2022 12:07 )   PT: 12.9 sec;   INR: 1.08          PTT - ( 04 Aug 2022 12:07 )  PTT:24.1 sec      RADIOLOGY & ADDITIONAL TESTS:    < from: US Retroperitoneal Complete (08.05.22 @ 17:30) >    ******PRELIMINARY REPORT******      ******PRELIMINARY REPORT******       ACC: [id number] EXAM:  US RETROPERITONEAL COMPLETE                          PROCEDURE DATE:  08/05/2022    ******PRELIMINARY REPORT******      ******PRELIMINARY REPORT******           INTERPRETATION:  RENAL and PELVIS ULTRASOUND dated 8/5/2022 5:30 PM    Indication: Elevated creatinine    Prior studies: None    Findings:    RIGHT KIDNEY:  Length: 11.4 cm  Lesions: None  Hydronephrosis: None  Stones: None  Echogenicity: Normal    LEFT KIDNEY:  Length: 11.8 cm  Lesions: None  Hydronephrosis: None  Stones: None  Echogenicity: Normal    URINARY BLADDER:  Ureteral jets: Both visible.  Filling defects: None  Bladder volume: Pre-void: 369 ml; Post-void: 4 ml.  There is an inferiorly oriented 7.5 x 4.0 cm outpouching of the bladder.      IMPRESSION:  1.  No hydronephrosis.  2.  There is a 7.5 x 4.0 cm outpouching of the bladder, likely bladder   diverticulum.        ******PRELIMINARY REPORT******      ******PRELIMINARY REPORT******       ARTURO SILVA MD; Resident Radiologist  This document is a PRELIMINARY interpretation and is pending final   attending approval. Aug  5 2022  7:04PM    < end of copied text >  
37F PMH fibroids and menorrhagia who presented for myomectomy s/p abdominal myomectomy with 55 fibroids removed with large EBL (2L) and 6 hours open time. Surgery complicated by downtrending hgb and tachycardia and prerenal VICK. IR consulted for UAE in setting of uterine hemorrhage following myomectomy. Now s/p bilateral embolization of uterine arteries by Dr. Duran on 8/4/22.    Afebrile, tachycardic overnight HR  past 24 hr  Hgb 9.0, stable this AM    Patient seen and examined at bedside.    Right groin c/d/i with no hematoma.       
Patient is a 37y Female seen and evaluated at bedside. No acute distress, patient seen walking in hallway and states that she feels much better. Kidney function improving.       Meds:    acetaminophen     Tablet .. 1000 every 6 hours PRN  acetaminophen     Tablet .. 1000 every 6 hours  iron sucrose IVPB 300 every 24 hours  lactated ringers. 1000 <Continuous>  metoclopramide Injectable 10 every 6 hours PRN  ondansetron Injectable 8 every 6 hours PRN  oxyCODONE    IR 5 every 3 hours PRN  oxyCODONE    IR 10 every 4 hours PRN  simethicone 80 every 6 hours      T(C): , Max: 37.6 (08-04-22 @ 21:34)  T(F): , Max: 99.6 (08-04-22 @ 21:34)  HR: 101 (08-05-22 @ 11:05)  BP: 145/84 (08-05-22 @ 11:05)  BP(mean): --  RR: 17 (08-05-22 @ 11:05)  SpO2: 96% (08-05-22 @ 11:05)  Wt(kg): --    08-04 @ 07:01  -  08-05 @ 07:00  --------------------------------------------------------  IN: 0 mL / OUT: 5325 mL / NET: -5325 mL    08-05 @ 07:01  -  08-05 @ 12:04  --------------------------------------------------------  IN: 300 mL / OUT: 300 mL / NET: 0 mL          Review of Systems:  ROS negative except as per HPI      PHYSICAL EXAM:  GENERAL: NAD, ambulating in hill way   NECK: supple, No JVD  CHEST/LUNG: Clear to auscultation bilaterally  HEART: normal S1S2, RRR  ABDOMEN: Soft, Nontender, +BS, No flank tenderness bilateral  EXTREMITIES: No clubbing, cyanosis, or edema   NEUROLOGY: AAO x3, no focal neurological deficit        LABS:                        9.0    13.09 )-----------( 135      ( 05 Aug 2022 05:30 )             26.5     08-05    140  |  110<H>  |  15  ----------------------------<  107<H>  4.7   |  22  |  1.36<H>    Ca    7.5<L>      05 Aug 2022 05:30  Phos  1.8     08-05  Mg     2.5     08-05    TPro  5.2<L>  /  Alb  2.5<L>  /  TBili  0.6  /  DBili  x   /  AST  36  /  ALT  15  /  AlkPhos  51  08-05      PT/INR - ( 04 Aug 2022 12:07 )   PT: 12.9 sec;   INR: 1.08          PTT - ( 04 Aug 2022 12:07 )  PTT:24.1 sec    Creatinine, Random Urine: 58 mg/dL (08-05 @ 04:00)  Sodium, Random Urine: 41 mmol/L (08-05 @ 04:00)  Sodium, Random Urine: <20 mmol/L (08-04 @ 12:13)  Osmolality, Random Urine: 94 mosm/kg (08-04 @ 12:13)  Potassium, Random Urine: 6 mmol/L (08-04 @ 12:13)  Chloride, Random Urine: 20 mmol/L (08-04 @ 12:13)  Creatinine, Random Urine: 21 mg/dL (08-04 @ 12:13)  Sodium, Random Urine: <20 mmol/L (08-03 @ 12:37)  Creatinine, Random Urine: 291 mg/dL (08-03 @ 12:37)  Osmolality, Random Urine: 456 mosm/kg (08-03 @ 12:37)  Potassium, Random Urine: 87 mmol/L (08-03 @ 12:37)  Chloride, Random Urine: 24 mmol/L (08-03 @ 12:37)        RADIOLOGY & ADDITIONAL STUDIES:          
Pt seen and examined at bedside. Pt complains of mild abdominal pain.   Pt denies any fever, chills, chest pain, SOB, nausea or vomiting     T(F): 97 (08-02-22 @ 20:08), Max: 97 (08-02-22 @ 20:08)  HR: 85 (08-02-22 @ 22:16) (80 - 88)  BP: 109/63 (08-02-22 @ 22:16) (84/48 - 120/70)  RR: 17 (08-02-22 @ 22:16) (16 - 19)  SpO2: 96% (08-02-22 @ 22:16) (95% - 100%)  Wt(kg): --    08-02 @ 07:01  -  08-02 @ 22:45  --------------------------------------------------------  IN: 580 mL / OUT: 100 mL / NET: 480 mL        acetaminophen     Tablet .. 1000 milliGRAM(s) Oral once  acetaminophen     Tablet .. 1000 milliGRAM(s) Oral every 6 hours  acetaminophen     Tablet .. 1000 milliGRAM(s) Oral every 6 hours PRN Mild Pain (1 - 3)  celecoxib 400 milliGRAM(s) Oral once  gabapentin 300 milliGRAM(s) Oral once  heparin   Injectable 5000 Unit(s) SubCutaneous once  ibuprofen  Tablet. 600 milliGRAM(s) Oral every 6 hours PRN Mild Pain (1 - 3)  ketorolac   Injectable 30 milliGRAM(s) IV Push every 8 hours  lactated ringers. 1000 milliLiter(s) IV Continuous <Continuous>  metoclopramide Injectable 10 milliGRAM(s) IV Push every 6 hours PRN nausea or vomitting  ondansetron Injectable 8 milliGRAM(s) IV Push every 6 hours PRN Nausea and/or Vomiting  oxyCODONE    IR 5 milliGRAM(s) Oral every 3 hours PRN Moderate Pain (4 - 6)  oxyCODONE    IR 10 milliGRAM(s) Oral every 4 hours PRN Severe Pain (7 - 10)  simethicone 80 milliGRAM(s) Chew every 6 hours  simethicone 80 milliGRAM(s) Chew every 6 hours PRN Gas      Physical exam:  Constitutional: NAD  Abdomen: incision site covered with pressure dressing mildly tender, nondistended  Extremities: no lower extremity edema, or calve tenderness. SCDs in place    
Pt seen and examined at bedside. Pt states mild abdominal pain. Pt is ambulating, tolerating regular diet, not yet flatus, urinating adequately.   Pt denies fever, chills, chest pain, SOB, nausea, vomiting, lightheadedness, dizziness.      T(F): 98.8 (08-05-22 @ 04:46), Max: 99.6 (08-04-22 @ 21:34)  HR: 99 (08-05-22 @ 04:46) (99 - 130)  BP: 125/76 (08-05-22 @ 04:46) (112/62 - 133/75)  RR: 18 (08-05-22 @ 04:46) (16 - 20)  SpO2: 96% (08-05-22 @ 04:46) (95% - 96%)  I&O's Summary    03 Aug 2022 07:01  -  04 Aug 2022 07:00  --------------------------------------------------------  IN: 0 mL / OUT: 845 mL / NET: -845 mL    04 Aug 2022 07:01  -  05 Aug 2022 06:58  --------------------------------------------------------  IN: 0 mL / OUT: 4675 mL / NET: -4675 mL        MEDICATIONS  (STANDING):  acetaminophen     Tablet .. 1000 milliGRAM(s) Oral every 6 hours  iron sucrose IVPB 300 milliGRAM(s) IV Intermittent every 24 hours  lactated ringers. 1000 milliLiter(s) (140 mL/Hr) IV Continuous <Continuous>  simethicone 80 milliGRAM(s) Chew every 6 hours    MEDICATIONS  (PRN):  acetaminophen     Tablet .. 1000 milliGRAM(s) Oral every 6 hours PRN Mild Pain (1 - 3)  metoclopramide Injectable 10 milliGRAM(s) IV Push every 6 hours PRN nausea or vomitting  ondansetron Injectable 8 milliGRAM(s) IV Push every 6 hours PRN Nausea and/or Vomiting  oxyCODONE    IR 5 milliGRAM(s) Oral every 3 hours PRN Moderate Pain (4 - 6)  oxyCODONE    IR 10 milliGRAM(s) Oral every 4 hours PRN Severe Pain (7 - 10)      Physical Exam:  Constitutional: NAD  Pulmonary: comfortable on RA  Abdomen: incision site clean, dry, intact. Soft, mildly tender, mildly distended, no guarding, no rebound, +bowel sounds  Extremities: no lower extremity edema or calve tenderness. SCDs in place     LABS:                        9.0    13.09 )-----------( 135      ( 05 Aug 2022 05:30 )             26.5     08-05    140  |  110<H>  |  15  ----------------------------<  107<H>  4.7   |  22  |  1.36<H>    Ca    7.5<L>      05 Aug 2022 05:30  Phos  1.8     08-05  Mg     2.5     08-05    TPro  5.2<L>  /  Alb  2.5<L>  /  TBili  0.6  /  DBili  x   /  AST  36  /  ALT  15  /  AlkPhos  51  08-05    PT/INR - ( 04 Aug 2022 12:07 )   PT: 12.9 sec;   INR: 1.08          PTT - ( 04 Aug 2022 12:07 )  PTT:24.1 sec      RADIOLOGY & ADDITIONAL TESTS:    
Pt seen and examined at bedside. Pt states mild abdominal pain. Pt is not yet ambulating, tolerating  clear liquid diet, not yet flatus, not urinating adequately.   Pt denies fever, chills, chest pain, SOB, nausea, vomiting, lightheadedness, dizziness.      T(F): 97.7 (08-03-22 @ 04:17), Max: 98.5 (08-02-22 @ 22:35)  HR: 108 (08-03-22 @ 04:17) (80 - 120)  BP: 119/79 (08-03-22 @ 04:17) (84/48 - 120/70)  RR: 17 (08-03-22 @ 04:17) (16 - 19)  SpO2: 97% (08-03-22 @ 04:17) (95% - 100%)  I&O's Summary    02 Aug 2022 07:01  -  03 Aug 2022 07:00  --------------------------------------------------------  IN: 580 mL / OUT: 240 mL / NET: 340 mL        MEDICATIONS  (STANDING):  iron sucrose IVPB 300 milliGRAM(s) IV Intermittent every 24 hours  lactated ringers. 1000 milliLiter(s) (180 mL/Hr) IV Continuous <Continuous>  simethicone 80 milliGRAM(s) Chew every 6 hours    MEDICATIONS  (PRN):  acetaminophen     Tablet .. 1000 milliGRAM(s) Oral every 6 hours PRN Mild Pain (1 - 3)  metoclopramide Injectable 10 milliGRAM(s) IV Push every 6 hours PRN nausea or vomitting  ondansetron Injectable 8 milliGRAM(s) IV Push every 6 hours PRN Nausea and/or Vomiting  oxyCODONE    IR 5 milliGRAM(s) Oral every 3 hours PRN Moderate Pain (4 - 6)  oxyCODONE    IR 10 milliGRAM(s) Oral every 4 hours PRN Severe Pain (7 - 10)      Physical Exam:  Constitutional: NAD  Pulmonary: comfortable on RA  Abdomen: incision site clean, dry, intact. Soft, mildly tender, not distended, no guarding, no rebound, + bowel sounds  Extremities: no lower extremity edema or calve tenderness. SCDs in place     LABS:                        11.5   25.37 )-----------( 227      ( 03 Aug 2022 03:55 )             36.0     08-03    136  |  107  |  15  ----------------------------<  166<H>  5.8<H>   |  18<L>  |  1.36<H>    Ca    7.4<L>      03 Aug 2022 03:55      PT/INR - ( 03 Aug 2022 03:55 )   PT: 13.3 sec;   INR: 1.12          PTT - ( 03 Aug 2022 03:55 )  PTT:25.4 sec      RADIOLOGY & ADDITIONAL TESTS:    
Pt seen and examined at bedside. Pt states mild abdominal pain. Pt is not yet ambulating, tolerating clears , not yet flatus, not yet urinating adequately jones in place.   Pt denies fever, chills, chest pain, SOB, nausea, vomiting, lightheadedness, dizziness.      T(F): 98.4 (08-04-22 @ 04:07), Max: 99.8 (08-03-22 @ 16:08)  HR: 110 (08-04-22 @ 04:07) (110 - 131)  BP: 119/74 (08-04-22 @ 04:07) (99/56 - 119/74)  RR: 17 (08-04-22 @ 04:07) (16 - 18)  SpO2: 95% (08-04-22 @ 04:07) (95% - 98%)  I&O's Summary    02 Aug 2022 07:01  -  03 Aug 2022 07:00  --------------------------------------------------------  IN: 580 mL / OUT: 240 mL / NET: 340 mL    03 Aug 2022 07:01  -  04 Aug 2022 06:58  --------------------------------------------------------  IN: 0 mL / OUT: 845 mL / NET: -845 mL        MEDICATIONS  (STANDING):  acetaminophen     Tablet .. 1000 milliGRAM(s) Oral every 6 hours  iron sucrose IVPB 300 milliGRAM(s) IV Intermittent every 24 hours  lactated ringers. 1000 milliLiter(s) (180 mL/Hr) IV Continuous <Continuous>  simethicone 80 milliGRAM(s) Chew every 6 hours  sodium chloride 0.9%. 1000 milliLiter(s) (1000 mL/Hr) IV Continuous <Continuous>    MEDICATIONS  (PRN):  acetaminophen     Tablet .. 1000 milliGRAM(s) Oral every 6 hours PRN Mild Pain (1 - 3)  metoclopramide Injectable 10 milliGRAM(s) IV Push every 6 hours PRN nausea or vomitting  ondansetron Injectable 8 milliGRAM(s) IV Push every 6 hours PRN Nausea and/or Vomiting  oxyCODONE    IR 5 milliGRAM(s) Oral every 3 hours PRN Moderate Pain (4 - 6)  oxyCODONE    IR 10 milliGRAM(s) Oral every 4 hours PRN Severe Pain (7 - 10)      Physical Exam:  Constitutional: NAD  Pulmonary: comfortable on RA  Abdomen: incision site clean, dry, intact. Soft, mildly tender, not distended, no guarding, no rebound, +bowel sounds  Extremities: no lower extremity edema or calve tenderness. SCDs in place     LABS:                        8.7    15.88 )-----------( 156      ( 03 Aug 2022 18:18 )             26.7     08-03    131<L>  |  102  |  21  ----------------------------<  141<H>  4.6   |  19<L>  |  1.52<H>    Ca    6.5<LL>      03 Aug 2022 18:18  Phos  2.9     08-03  Mg     1.5     08-03    TPro  x   /  Alb  2.9<L>  /  TBili  x   /  DBili  x   /  AST  x   /  ALT  x   /  AlkPhos  x   08-03    PT/INR - ( 03 Aug 2022 03:55 )   PT: 13.3 sec;   INR: 1.12          PTT - ( 03 Aug 2022 03:55 )  PTT:25.4 sec      RADIOLOGY & ADDITIONAL TESTS:

## 2022-08-06 NOTE — DISCHARGE NOTE PROVIDER - HOSPITAL COURSE
Patient is status post a scheduled abdominal myomectomy c/b prerenal VICK and postoperative anemia for which she underwent uterine artery embolization by IR. During her hospital course she received 3U PRBC. She is currently meeting all postoperative milestones appropriately.  Vitals are stable for discharge.

## 2022-08-06 NOTE — PROGRESS NOTE ADULT - REASON FOR ADMISSION
Scheduled abdominal myomectomy

## 2022-08-06 NOTE — DISCHARGE NOTE NURSING/CASE MANAGEMENT/SOCIAL WORK - NSDCPEFALRISK_GEN_ALL_CORE
For information on Fall & Injury Prevention, visit: [email or web address] OR  [email or web address] OR  https://www.cdc.gov/steadi/patient.html

## 2022-08-06 NOTE — DISCHARGE NOTE PROVIDER - NSDCMRMEDTOKEN_GEN_ALL_CORE_FT
acetaminophen 500 mg oral tablet: 2 tab(s) orally every 6 hours, As needed, Mild Pain (1 - 3)  acetaminophen 500 mg oral tablet: 2 tab(s) orally every 6 hours  oxyCODONE 10 mg oral tablet: 1 tab(s) orally every 4 hours, As needed, Severe Pain (7 - 10)  oxyCODONE 5 mg oral capsule: 1 cap(s) orally every 6 hours, As Needed -for severe pain MDD:4   oxyCODONE 5 mg oral tablet: 1 tab(s) orally every 3 hours, As needed, Moderate Pain (4 - 6)

## 2022-08-06 NOTE — DISCHARGE NOTE NURSING/CASE MANAGEMENT/SOCIAL WORK - PATIENT PORTAL LINK FT
You can access the FollowMyHealth Patient Portal offered by [facility] by registering at the following website: [email or web address]. By joining [employer]’s FollowMyHealth portal, you will also be able to view your health information using other applications (apps) compatible with our system.

## 2022-08-06 NOTE — DISCHARGE NOTE PROVIDER - CARE PROVIDER_API CALL
Tammy Mistry)  Obstetrics and Gynecology  [address], [address] [place] [address]  Phone: [phone]  Fax: [phone]  Established Patient  Follow Up Time: 2 weeks

## 2022-08-06 NOTE — DISCHARGE NOTE PROVIDER - NSDCCPCAREPLAN_GEN_ALL_CORE_FT
PRINCIPAL DISCHARGE DIAGNOSIS  Diagnosis: Fibroids  Assessment and Plan of Treatment:       SECONDARY DISCHARGE DIAGNOSES  Diagnosis: Anemia due to acute blood loss  Assessment and Plan of Treatment:     Diagnosis: VICK (acute kidney injury)  Assessment and Plan of Treatment:

## 2022-08-06 NOTE — PROGRESS NOTE ADULT - ASSESSMENT
37y y.o. s/p abdominal myomectomy of 55 fibroids, entered cavity, admitted for pain control and postoperative monitoring. PostOP course complicated by prerenal VICK.    Neuro: Tylenol, Toradol, Oxy PRN  Pulm: RA, ISS  Cards: VSS, heplocked  GI: LRD, +/-  : voiding. Post OP VICK with max Cr of 1.73, Prerenal . Improved with hydration. Nephro consulted, Cr monitoring q12h. Retroperitoneal US done, f/u final  Heme: s/p 3pRBCs (8/3-4). UAE (foam) done by IR on 8/4 for decreasing Hb. Low suspision for intraabdominal bleeding.  DVT: SCD    - F/u retroperitoneal ultrasound  f/u results   - Cr improving, appreciate nephrology recs  - Pt is no passing flatus  - Will d/c home today with close follow up  - D/w Dr. Mistry

## 2022-08-10 DIAGNOSIS — J45.909 UNSPECIFIED ASTHMA, UNCOMPLICATED: ICD-10-CM

## 2022-08-10 DIAGNOSIS — N17.0 ACUTE KIDNEY FAILURE WITH TUBULAR NECROSIS: ICD-10-CM

## 2022-08-10 DIAGNOSIS — D25.9 LEIOMYOMA OF UTERUS, UNSPECIFIED: ICD-10-CM

## 2022-08-10 DIAGNOSIS — Z91.018 ALLERGY TO OTHER FOODS: ICD-10-CM

## 2022-08-10 DIAGNOSIS — Z91.010 ALLERGY TO PEANUTS: ICD-10-CM

## 2022-08-10 DIAGNOSIS — Z88.1 ALLERGY STATUS TO OTHER ANTIBIOTIC AGENTS STATUS: ICD-10-CM

## 2022-08-10 DIAGNOSIS — N92.0 EXCESSIVE AND FREQUENT MENSTRUATION WITH REGULAR CYCLE: ICD-10-CM

## 2022-08-10 DIAGNOSIS — E83.39 OTHER DISORDERS OF PHOSPHORUS METABOLISM: ICD-10-CM

## 2022-08-10 DIAGNOSIS — D62 ACUTE POSTHEMORRHAGIC ANEMIA: ICD-10-CM

## 2022-08-15 LAB — SURGICAL PATHOLOGY STUDY: SIGNIFICANT CHANGE UP

## 2022-12-12 PROBLEM — J45.909 UNSPECIFIED ASTHMA, UNCOMPLICATED: Chronic | Status: ACTIVE | Noted: 2022-08-01

## 2022-12-12 PROBLEM — D21.9 BENIGN NEOPLASM OF CONNECTIVE AND OTHER SOFT TISSUE, UNSPECIFIED: Chronic | Status: ACTIVE | Noted: 2021-06-03

## 2022-12-12 PROBLEM — D64.9 ANEMIA, UNSPECIFIED: Chronic | Status: ACTIVE | Noted: 2022-08-01

## 2022-12-13 ENCOUNTER — APPOINTMENT (OUTPATIENT)
Dept: FAMILY MEDICINE | Facility: CLINIC | Age: 37
End: 2022-12-13

## 2022-12-13 DIAGNOSIS — R51.9 HEADACHE, UNSPECIFIED: ICD-10-CM

## 2022-12-13 PROCEDURE — 99214 OFFICE O/P EST MOD 30 MIN: CPT | Mod: 95

## 2022-12-13 NOTE — PLAN
[FreeTextEntry1] : nosebleeds\par internal, frequent \par sending to ent \par \par headaches \par has not consistently tried to treat with otc meds\par encouraged otc meds for txt \par if no resolution after 4 days of txt with otc meds, return to care\par patient mentioned still taking iron, with nosebleeds and hx of anemia, would consider following bloodwork if no resolution \par denies weakness, dizziness, loss/ trouble with balance \par will rtc if no relief

## 2022-12-13 NOTE — HISTORY OF PRESENT ILLNESS
[Home] : at home, [unfilled] , at the time of the visit. [Medical Office: ([facility])___] : at the medical office located in  [Verbal consent obtained from patient] : the patient, [unfilled] [FreeTextEntry8] : cc: headaches \par \par 38 yo f presents with headaches. \par Increasing nose bleeds internally, daily. Mentioned has had them for years, never had this examined. \par Mentioned increase heat in her workplace has not been helping. \par No new medications or nasal sprays. \par Has not taken blood pressure recently. \par Headaches located in different places, not migranes. \par Have been getting them daily for 2 weeks, 7 days a week. Milder on weekends, linger all day. \par Does not take medication everyday, wakes up with them as well. \par Will take tylenol, with some relief. Will alternate with BC powder. \par No dizziness, lightheadedness, loss of balance. No blurry vision. No changes when reading, no trouble with driving. \par No missing meals or snacktime, drinks water throughout the day.\par Sleeping ok. Recently finished a large paper-- in school, stressed/ anxious with work.

## 2022-12-29 ENCOUNTER — APPOINTMENT (OUTPATIENT)
Dept: OTOLARYNGOLOGY | Facility: CLINIC | Age: 37
End: 2022-12-29

## 2022-12-29 VITALS
TEMPERATURE: 97.9 F | SYSTOLIC BLOOD PRESSURE: 128 MMHG | HEART RATE: 78 BPM | HEIGHT: 62 IN | OXYGEN SATURATION: 98 % | DIASTOLIC BLOOD PRESSURE: 87 MMHG

## 2022-12-29 DIAGNOSIS — K21.9 GASTRO-ESOPHAGEAL REFLUX DISEASE W/OUT ESOPHAGITIS: ICD-10-CM

## 2022-12-29 DIAGNOSIS — Z87.898 PERSONAL HISTORY OF OTHER SPECIFIED CONDITIONS: ICD-10-CM

## 2022-12-29 PROCEDURE — 99203 OFFICE O/P NEW LOW 30 MIN: CPT | Mod: 25

## 2022-12-29 PROCEDURE — 31231 NASAL ENDOSCOPY DX: CPT

## 2022-12-29 NOTE — PHYSICAL EXAM
[Nasal Endoscopy Performed] : nasal endoscopy was performed, see procedure section for findings [de-identified] : large ITs [Normal] : assessment of respiratory effort is normal

## 2022-12-29 NOTE — HISTORY OF PRESENT ILLNESS
[de-identified] : Years of "internal" nosebleeds (which she defines as a salty taste in the back of her throat- she does not spit out blood) that have worsened in the last few weeks. No actual nosebleeds with visible drainage. Uses a humidifier at night. Hasn't tried any sprays. No nasal dyspnea. No blood thinners. Rare sinus infections. \par Perennial allergies to dust, pollen & food allergies. \par Fairly regular reflux that she doesn't treat. Denies globus but some hoarseness.

## 2022-12-29 NOTE — CONSULT LETTER
[Dear  ___] : Dear  [unfilled], [Consult Letter:] : I had the pleasure of evaluating your patient, [unfilled]. [Please see my note below.] : Please see my note below. [Consult Closing:] : Thank you very much for allowing me to participate in the care of this patient.  If you have any questions, please do not hesitate to contact me. [Sincerely,] : Sincerely, [FreeTextEntry3] : TASHIA Kim Jr, MD, FAAOHNS\par Otolaryngologist\par [facility]

## 2022-12-29 NOTE — ASSESSMENT
[FreeTextEntry1] : We discussed the lack of any obvious heme sources and her lack of actual visualization of blood during the episodes; she will try examining her OP with the next episode and well as expectorating to look for blood. \par \par Reviewed reflux precautions and provided the patient with the corresponding educational handout.\par

## 2022-12-29 NOTE — PROCEDURE
[FreeTextEntry6] : Indication: requirement for exam not possible via anterior rhinoscopy; chronic nasal obstruction\par After verbal consent and the administration of an aerosolized phenylephrine/lidocaine mix, examination was performed with a flexible endoscope attached to a video monitoring system. Findings:\par Septum: without significant deviation or impingement on other anatomic structures\par Mucosa: normal\par Polyposis: not present\par Inferior turbinates: hypertrophied\par Middle and superior turbinates: normal\par Inferior meatus: unremarkable\par Middle meatus: unremarkable\par Superior meatus: unremarkable\par Speno-ethmoidal recess: unremarkable\par Nasopharynx: superficial adenoid capillaries confirmed w/ NBI; no active bleeding\par Secretions: unremarkable\par Other findings: none [de-identified] : Indication: requirement for exam not possible via anterior examination; globus\par After verbal consent and the administration of an aerosolized phenylephrine/lidocaine mix, examination was performed with a flexible endoscope placed through the nose which was attached to a video monitoring system. Findings:\par Nasopharynx: unremarkable\par Soft palate, lateral and posterior pharyngeal walls: unremarkable\par Base of tongue & lingual tonsil: minimal retrodisplacement\par Vallecula: unremarkable\par Epiglottis: unremarkable\par Piriform sinuses and pharyngoesophageal junction: unremarkable\par Arytenoids and AE folds: moderate interarytenoid edema\par Ventricle and false vocal folds: unremarkable\par True vocal folds: Smooth free edge; surface without ectasias or edema; normal movement bilaterally with good apposition in phonation\par Visible subglottis: unremarkable\par Narrow-band imaging: not used\par Other findings: ELM

## 2023-02-01 NOTE — PATIENT PROFILE ADULT - NSPRESCRALCFREQ_GEN_A_NUR
[facility] called stating they need a PA on the [employer] and it can be faxed to [phone] Monthly or less

## 2023-02-09 NOTE — ED PROVIDER NOTE - NS_ATTENDINGSCRIBE_ED_ALL_ED
Prior to Admission I personally performed the service described in the documentation recorded by the scribe in my presence, and it accurately and completely records my words and actions.

## 2023-06-09 ENCOUNTER — APPOINTMENT (OUTPATIENT)
Dept: FAMILY MEDICINE | Facility: CLINIC | Age: 38
End: 2023-06-09

## 2023-06-12 ENCOUNTER — APPOINTMENT (OUTPATIENT)
Dept: FAMILY MEDICINE | Facility: CLINIC | Age: 38
End: 2023-06-12
Payer: COMMERCIAL

## 2023-06-12 PROCEDURE — 99213 OFFICE O/P EST LOW 20 MIN: CPT | Mod: 95

## 2023-06-12 NOTE — HISTORY OF PRESENT ILLNESS
[Home] : at home, [unfilled] , at the time of the visit. [Medical Office: ([facility])___] : at the medical office located in  [Verbal consent obtained from patient] : the patient, [unfilled] [FreeTextEntry1] : weight loss medication  [de-identified] : 39 yo f presents to discuss weight loss medication \par no recent weight or blood work in the chart \par interested in reviewing risks/ benefits/ side effects/ alternatives to meds \par

## 2023-06-12 NOTE — PLAN
[FreeTextEntry1] : weight gain \par pt. interested in medication for weight loss \par no recent labs or vitals in chart, will need an appt. \par reviewed risks benefits side effects alternatives to weight loss meds (including but not limited to mood changes, weight changes, nausea, vomiting, abd pain, diarhea, cancer- thyroid, and galbladder pain/ infection), patient interested in wegovy \par will return for a visit to discuss options further, vitals, blood work

## 2023-06-23 ENCOUNTER — APPOINTMENT (OUTPATIENT)
Dept: FAMILY MEDICINE | Facility: CLINIC | Age: 38
End: 2023-06-23

## 2023-06-23 ENCOUNTER — NON-APPOINTMENT (OUTPATIENT)
Age: 38
End: 2023-06-23

## 2023-07-07 ENCOUNTER — APPOINTMENT (OUTPATIENT)
Dept: FAMILY MEDICINE | Facility: CLINIC | Age: 38
End: 2023-07-07
Payer: COMMERCIAL

## 2023-07-07 VITALS
TEMPERATURE: 98.7 F | DIASTOLIC BLOOD PRESSURE: 81 MMHG | SYSTOLIC BLOOD PRESSURE: 115 MMHG | HEART RATE: 72 BPM | BODY MASS INDEX: 43.79 KG/M2 | WEIGHT: 238 LBS | HEIGHT: 62 IN | OXYGEN SATURATION: 99 %

## 2023-07-07 PROCEDURE — G0447 BEHAVIOR COUNSEL OBESITY 15M: CPT | Mod: 59

## 2023-07-07 PROCEDURE — 36415 COLL VENOUS BLD VENIPUNCTURE: CPT

## 2023-07-07 PROCEDURE — 99214 OFFICE O/P EST MOD 30 MIN: CPT | Mod: 25

## 2023-07-07 NOTE — HISTORY OF PRESENT ILLNESS
[FreeTextEntry1] : follow up hx of anemia \par weight gain  [de-identified] : 37 yo f presents interested in weight loss meds\par has gained weight-- even with diet and exercise \par \par also with hx of anemia, interested in follow up

## 2023-07-07 NOTE — PLAN
[FreeTextEntry1] : weight gain \par weight not controlled even with diet and exercise \par encouraged cont with diet and exercise \par pt. interested in medication for weight loss \par labs drawn in office today \par reviewed risks benefits side effects alternatives to weight loss meds (including but not limited to mood changes, weight changes, nausea, vomiting, abd pain, diarhea, cancer- thyroid, and galbladder pain/ infection)\par  \par hx of anemia \par follow up labs

## 2023-07-07 NOTE — HEALTH RISK ASSESSMENT
[0] : 2) Feeling down, depressed, or hopeless: Not at all (0) [PHQ-2 Negative - No further assessment needed] : PHQ-2 Negative - No further assessment needed [[id number]] : 0 [Never] : Never

## 2023-07-11 ENCOUNTER — RX CHANGE (OUTPATIENT)
Age: 38
End: 2023-07-11

## 2023-07-14 ENCOUNTER — TRANSCRIPTION ENCOUNTER (OUTPATIENT)
Age: 38
End: 2023-07-14

## 2023-07-18 LAB
25(OH)D3 SERPL-MCNC: 38.9 NG/ML
ALBUMIN SERPL ELPH-MCNC: 4.3 G/DL
ALP BLD-CCNC: 59 U/L
ALT SERPL-CCNC: 37 U/L
ANION GAP SERPL CALC-SCNC: 13 MMOL/L
AST SERPL-CCNC: 23 U/L
BILIRUB SERPL-MCNC: 0.7 MG/DL
BUN SERPL-MCNC: 8 MG/DL
CALCIUM SERPL-MCNC: 9.7 MG/DL
CHLORIDE SERPL-SCNC: 105 MMOL/L
CHOLEST SERPL-MCNC: 224 MG/DL
CO2 SERPL-SCNC: 23 MMOL/L
CREAT SERPL-MCNC: 0.75 MG/DL
EGFR: 103 ML/MIN/1.73M2
ESTIMATED AVERAGE GLUCOSE: 105 MG/DL
FERRITIN SERPL-MCNC: 32 NG/ML
FOLATE SERPL-MCNC: >20 NG/ML
GLUCOSE SERPL-MCNC: 76 MG/DL
HBA1C MFR BLD HPLC: 5.3 %
HDLC SERPL-MCNC: 70 MG/DL
IRON SATN MFR SERPL: 19 %
IRON SERPL-MCNC: 62 UG/DL
LDLC SERPL CALC-MCNC: 134 MG/DL
NONHDLC SERPL-MCNC: 154 MG/DL
POTASSIUM SERPL-SCNC: 4.7 MMOL/L
PROT SERPL-MCNC: 7.2 G/DL
SODIUM SERPL-SCNC: 140 MMOL/L
TIBC SERPL-MCNC: 327 UG/DL
TRIGL SERPL-MCNC: 100 MG/DL
TSH SERPL-ACNC: 1.01 UIU/ML
UIBC SERPL-MCNC: 264 UG/DL
VIT B12 SERPL-MCNC: 1538 PG/ML

## 2023-07-19 ENCOUNTER — NON-APPOINTMENT (OUTPATIENT)
Age: 38
End: 2023-07-19

## 2023-08-02 ENCOUNTER — TRANSCRIPTION ENCOUNTER (OUTPATIENT)
Age: 38
End: 2023-08-02

## 2023-08-14 ENCOUNTER — APPOINTMENT (OUTPATIENT)
Dept: FAMILY MEDICINE | Facility: CLINIC | Age: 38
End: 2023-08-14
Payer: COMMERCIAL

## 2023-08-14 PROCEDURE — 99214 OFFICE O/P EST MOD 30 MIN: CPT | Mod: 95

## 2023-09-12 ENCOUNTER — APPOINTMENT (OUTPATIENT)
Dept: FAMILY MEDICINE | Facility: CLINIC | Age: 38
End: 2023-09-12
Payer: COMMERCIAL

## 2023-09-12 DIAGNOSIS — S06.0XAA CONCUSSION WITH LOSS OF CONSCIOUSNESS STATUS UNKNOWN, INITIAL ENCOUNTER: ICD-10-CM

## 2023-09-12 PROCEDURE — 99213 OFFICE O/P EST LOW 20 MIN: CPT | Mod: 95

## 2023-10-05 ENCOUNTER — APPOINTMENT (OUTPATIENT)
Dept: FAMILY MEDICINE | Facility: CLINIC | Age: 38
End: 2023-10-05
Payer: COMMERCIAL

## 2023-10-05 VITALS
HEART RATE: 99 BPM | OXYGEN SATURATION: 98 % | BODY MASS INDEX: 42.03 KG/M2 | TEMPERATURE: 98.2 F | WEIGHT: 228.38 LBS | HEIGHT: 62 IN

## 2023-10-05 VITALS — DIASTOLIC BLOOD PRESSURE: 70 MMHG | SYSTOLIC BLOOD PRESSURE: 104 MMHG

## 2023-10-05 PROCEDURE — 99214 OFFICE O/P EST MOD 30 MIN: CPT | Mod: 25

## 2023-10-05 PROCEDURE — 36415 COLL VENOUS BLD VENIPUNCTURE: CPT

## 2023-10-05 RX ORDER — TIRZEPATIDE 2.5 MG/.5ML
2.5 INJECTION, SOLUTION SUBCUTANEOUS
Qty: 4 | Refills: 0 | Status: DISCONTINUED | COMMUNITY
Start: 2023-07-07 | End: 2023-10-05

## 2023-10-12 LAB
ALBUMIN SERPL ELPH-MCNC: 4.3 G/DL
ALP BLD-CCNC: 56 U/L
ALT SERPL-CCNC: 14 U/L
ANION GAP SERPL CALC-SCNC: 14 MMOL/L
AST SERPL-CCNC: 15 U/L
BILIRUB SERPL-MCNC: 0.6 MG/DL
BUN SERPL-MCNC: 11 MG/DL
CALCIUM SERPL-MCNC: 10 MG/DL
CHLORIDE SERPL-SCNC: 105 MMOL/L
CHOLEST SERPL-MCNC: 216 MG/DL
CO2 SERPL-SCNC: 20 MMOL/L
CREAT SERPL-MCNC: 0.79 MG/DL
EGFR: 97 ML/MIN/1.73M2
ESTIMATED AVERAGE GLUCOSE: 105 MG/DL
FOLATE SERPL-MCNC: 13.1 NG/ML
GLUCOSE SERPL-MCNC: 77 MG/DL
HBA1C MFR BLD HPLC: 5.3 %
HCT VFR BLD CALC: 38.9 %
HDLC SERPL-MCNC: 58 MG/DL
HGB BLD-MCNC: 12.1 G/DL
IRON SATN MFR SERPL: 12 %
IRON SERPL-MCNC: 42 UG/DL
LDLC SERPL CALC-MCNC: 141 MG/DL
MCHC RBC-ENTMCNC: 28.5 PG
MCHC RBC-ENTMCNC: 31.1 GM/DL
MCV RBC AUTO: 91.7 FL
NONHDLC SERPL-MCNC: 158 MG/DL
PLATELET # BLD AUTO: 246 K/UL
POTASSIUM SERPL-SCNC: 4.5 MMOL/L
PROT SERPL-MCNC: 7.3 G/DL
RBC # BLD: 4.24 M/UL
RBC # FLD: 14.3 %
SODIUM SERPL-SCNC: 139 MMOL/L
TIBC SERPL-MCNC: 354 UG/DL
TRIGL SERPL-MCNC: 95 MG/DL
UIBC SERPL-MCNC: 313 UG/DL
VIT B12 SERPL-MCNC: 831 PG/ML
WBC # FLD AUTO: 7.03 K/UL

## 2023-11-20 ENCOUNTER — APPOINTMENT (OUTPATIENT)
Dept: FAMILY MEDICINE | Facility: CLINIC | Age: 38
End: 2023-11-20

## 2023-12-01 ENCOUNTER — APPOINTMENT (OUTPATIENT)
Dept: FAMILY MEDICINE | Facility: CLINIC | Age: 38
End: 2023-12-01
Payer: COMMERCIAL

## 2023-12-01 VITALS
SYSTOLIC BLOOD PRESSURE: 140 MMHG | HEIGHT: 62 IN | WEIGHT: 233 LBS | BODY MASS INDEX: 42.88 KG/M2 | HEART RATE: 97 BPM | OXYGEN SATURATION: 100 % | DIASTOLIC BLOOD PRESSURE: 80 MMHG | TEMPERATURE: 97.8 F

## 2023-12-01 DIAGNOSIS — R63.5 ABNORMAL WEIGHT GAIN: ICD-10-CM

## 2023-12-01 PROCEDURE — 99214 OFFICE O/P EST MOD 30 MIN: CPT

## 2023-12-29 PROBLEM — R63.5 WEIGHT GAIN: Status: ACTIVE | Noted: 2023-06-12

## 2023-12-29 NOTE — HEALTH RISK ASSESSMENT
[0] : 2) Feeling down, depressed, or hopeless: Not at all (0) [PHQ-2 Negative - No further assessment needed] : PHQ-2 Negative - No further assessment needed [Never] : Never [[id number]] : 0

## 2023-12-29 NOTE — PLAN
[FreeTextEntry1] : weight gain, obesity  not improving  will increase dose of meds  reviewed risks, benefits, side effects, alternatives, regimen  follow up in 2 months, sooner if needed

## 2023-12-29 NOTE — HISTORY OF PRESENT ILLNESS
[FreeTextEntry1] : weight loss meds  [de-identified] : 37 yo f presents to discuss weight loss meds  has not lost much weight, interested in increase dose

## 2024-02-15 ENCOUNTER — APPOINTMENT (OUTPATIENT)
Dept: FAMILY MEDICINE | Facility: CLINIC | Age: 39
End: 2024-02-15
Payer: COMMERCIAL

## 2024-02-15 VITALS
TEMPERATURE: 97.8 F | HEART RATE: 97 BPM | WEIGHT: 218 LBS | HEIGHT: 62 IN | BODY MASS INDEX: 40.12 KG/M2 | DIASTOLIC BLOOD PRESSURE: 84 MMHG | OXYGEN SATURATION: 99 % | SYSTOLIC BLOOD PRESSURE: 129 MMHG

## 2024-02-15 DIAGNOSIS — M25.512 PAIN IN LEFT SHOULDER: ICD-10-CM

## 2024-02-15 DIAGNOSIS — G89.29 PAIN IN LEFT SHOULDER: ICD-10-CM

## 2024-02-15 PROCEDURE — 36415 COLL VENOUS BLD VENIPUNCTURE: CPT

## 2024-02-15 PROCEDURE — 99214 OFFICE O/P EST MOD 30 MIN: CPT

## 2024-02-15 PROCEDURE — G2211 COMPLEX E/M VISIT ADD ON: CPT

## 2024-02-15 RX ORDER — PHENTERMINE AND TOPIRAMATE 3.75; 23 MG/1; MG/1
3.75-23 CAPSULE, EXTENDED RELEASE ORAL
Qty: 14 | Refills: 0 | Status: DISCONTINUED | COMMUNITY
Start: 2023-08-14 | End: 2024-02-15

## 2024-02-15 RX ORDER — PHENTERMINE AND TOPIRAMATE 11.25; 69 MG/1; MG/1
11.25-69 CAPSULE, EXTENDED RELEASE ORAL
Qty: 30 | Refills: 0 | Status: DISCONTINUED | COMMUNITY
Start: 2023-08-14 | End: 2024-02-15

## 2024-02-15 NOTE — HISTORY OF PRESENT ILLNESS
[FreeTextEntry1] : follow up labs  [de-identified] : 38 yo f presents to discuss labs  recently started mounjaro with weight management  here to repeat labs

## 2024-02-15 NOTE — HEALTH RISK ASSESSMENT
[0] : 2) Feeling down, depressed, or hopeless: Not at all (0) [PHQ-2 Negative - No further assessment needed] : PHQ-2 Negative - No further assessment needed [Never] : Never [[id number]] : 0

## 2024-02-15 NOTE — PLAN
[FreeTextEntry1] : weight gain, obesity  reviewed previous labs  losing weight, since started mounjaro with weight management [facility]  has stopped qysmia reviewed risks, benefits, side effects, alternatives, regimen  follow up labs, labs drawn in office   shoulder pain  will do trial of nsaids  reviewed risks, benefits, side effects, alternatives, regimen  bid x 5 days then prn

## 2024-02-16 LAB
ALBUMIN SERPL ELPH-MCNC: 4.1 G/DL
ALP BLD-CCNC: 52 U/L
ALT SERPL-CCNC: 10 U/L
ANION GAP SERPL CALC-SCNC: 13 MMOL/L
APPEARANCE: CLEAR
APTT BLD: 31.1 SEC
AST SERPL-CCNC: 16 U/L
BASOPHILS # BLD AUTO: 0.04 K/UL
BASOPHILS # BLD AUTO: 0.05 K/UL
BASOPHILS NFR BLD AUTO: 0.6 %
BASOPHILS NFR BLD AUTO: 0.7 %
BILIRUB SERPL-MCNC: 0.9 MG/DL
BILIRUBIN URINE: NEGATIVE
BLOOD URINE: NEGATIVE
BUN SERPL-MCNC: 7 MG/DL
CALCIUM SERPL-MCNC: 9.1 MG/DL
CHLORIDE SERPL-SCNC: 104 MMOL/L
CHOLEST SERPL-MCNC: 206 MG/DL
CO2 SERPL-SCNC: 21 MMOL/L
COLOR: NORMAL
CREAT SERPL-MCNC: 0.79 MG/DL
EGFR: 96 ML/MIN/1.73M2
EOSINOPHIL # BLD AUTO: 0.09 K/UL
EOSINOPHIL # BLD AUTO: 0.14 K/UL
EOSINOPHIL NFR BLD AUTO: 1.3 %
EOSINOPHIL NFR BLD AUTO: 1.9 %
ESTIMATED AVERAGE GLUCOSE: 100 MG/DL
FOLATE SERPL-MCNC: >20 NG/ML
GLUCOSE QUALITATIVE U: NEGATIVE
GLUCOSE SERPL-MCNC: 67 MG/DL
HBA1C MFR BLD HPLC: 5.1 %
HCG SERPL-MCNC: <1 MIU/ML
HCT VFR BLD CALC: 38.3 %
HCT VFR BLD CALC: 42.7 %
HDLC SERPL-MCNC: 55 MG/DL
HGB BLD-MCNC: 12.2 G/DL
HGB BLD-MCNC: 13.4 G/DL
IMM GRANULOCYTES NFR BLD AUTO: 0.1 %
IMM GRANULOCYTES NFR BLD AUTO: 0.3 %
INR PPP: 0.95 RATIO
IRON SATN MFR SERPL: 22 %
IRON SERPL-MCNC: 71 UG/DL
KETONES URINE: NEGATIVE
LDLC SERPL CALC-MCNC: 137 MG/DL
LEUKOCYTE ESTERASE URINE: NEGATIVE
LYMPHOCYTES # BLD AUTO: 1.65 K/UL
LYMPHOCYTES # BLD AUTO: 1.85 K/UL
LYMPHOCYTES NFR BLD AUTO: 24.5 %
LYMPHOCYTES NFR BLD AUTO: 25.6 %
MAN DIFF?: NORMAL
MAN DIFF?: NORMAL
MCHC RBC-ENTMCNC: 27.7 PG
MCHC RBC-ENTMCNC: 29.3 PG
MCHC RBC-ENTMCNC: 31.4 GM/DL
MCHC RBC-ENTMCNC: 31.9 GM/DL
MCV RBC AUTO: 87 FL
MCV RBC AUTO: 93.4 FL
MONOCYTES # BLD AUTO: 0.45 K/UL
MONOCYTES # BLD AUTO: 0.55 K/UL
MONOCYTES NFR BLD AUTO: 6.7 %
MONOCYTES NFR BLD AUTO: 7.6 %
NEUTROPHILS # BLD AUTO: 4.5 K/UL
NEUTROPHILS # BLD AUTO: 4.61 K/UL
NEUTROPHILS NFR BLD AUTO: 63.9 %
NEUTROPHILS NFR BLD AUTO: 66.8 %
NITRITE URINE: NEGATIVE
NONHDLC SERPL-MCNC: 151 MG/DL
PH URINE: 6
PLATELET # BLD AUTO: 219 K/UL
PLATELET # BLD AUTO: 266 K/UL
POTASSIUM SERPL-SCNC: 4.3 MMOL/L
PROT SERPL-MCNC: 7.4 G/DL
PROTEIN URINE: NEGATIVE
PT BLD: 11.1 SEC
RBC # BLD: 4.4 M/UL
RBC # BLD: 4.57 M/UL
RBC # FLD: 14.6 %
RBC # FLD: 14.7 %
SODIUM SERPL-SCNC: 138 MMOL/L
SPECIFIC GRAVITY URINE: 1.01
TIBC SERPL-MCNC: 324 UG/DL
TRIGL SERPL-MCNC: 75 MG/DL
UIBC SERPL-MCNC: 253 UG/DL
UROBILINOGEN URINE: NORMAL
VIT B12 SERPL-MCNC: 732 PG/ML
WBC # FLD AUTO: 6.74 K/UL
WBC # FLD AUTO: 7.22 K/UL

## 2024-06-27 ENCOUNTER — APPOINTMENT (OUTPATIENT)
Dept: FAMILY MEDICINE | Facility: CLINIC | Age: 39
End: 2024-06-27
Payer: COMMERCIAL

## 2024-06-27 VITALS
HEIGHT: 62 IN | SYSTOLIC BLOOD PRESSURE: 113 MMHG | OXYGEN SATURATION: 97 % | HEART RATE: 107 BPM | DIASTOLIC BLOOD PRESSURE: 83 MMHG | WEIGHT: 185 LBS | BODY MASS INDEX: 34.04 KG/M2 | TEMPERATURE: 97.3 F

## 2024-06-27 DIAGNOSIS — E66.9 OBESITY, UNSPECIFIED: ICD-10-CM

## 2024-06-27 DIAGNOSIS — D50.9 IRON DEFICIENCY ANEMIA, UNSPECIFIED: ICD-10-CM

## 2024-06-27 PROCEDURE — G2211 COMPLEX E/M VISIT ADD ON: CPT | Mod: NC

## 2024-06-27 PROCEDURE — 36415 COLL VENOUS BLD VENIPUNCTURE: CPT

## 2024-06-27 PROCEDURE — 99214 OFFICE O/P EST MOD 30 MIN: CPT

## 2024-06-28 LAB
ALBUMIN SERPL ELPH-MCNC: 3.8 G/DL
ALP BLD-CCNC: 110 U/L
ALT SERPL-CCNC: 56 U/L
ANION GAP SERPL CALC-SCNC: 15 MMOL/L
AST SERPL-CCNC: 27 U/L
BASOPHILS # BLD AUTO: 0.02 K/UL
BASOPHILS NFR BLD AUTO: 0.4 %
BILIRUB SERPL-MCNC: 0.7 MG/DL
BUN SERPL-MCNC: 11 MG/DL
CALCIUM SERPL-MCNC: 9.2 MG/DL
CHLORIDE SERPL-SCNC: 103 MMOL/L
CHOLEST SERPL-MCNC: 197 MG/DL
CO2 SERPL-SCNC: 19 MMOL/L
CREAT SERPL-MCNC: 0.75 MG/DL
EGFR: 103 ML/MIN/1.73M2
EOSINOPHIL # BLD AUTO: 0.11 K/UL
EOSINOPHIL NFR BLD AUTO: 2 %
ESTIMATED AVERAGE GLUCOSE: 91 MG/DL
FOLATE SERPL-MCNC: >20 NG/ML
GLUCOSE SERPL-MCNC: 67 MG/DL
HBA1C MFR BLD HPLC: 4.8 %
HCT VFR BLD CALC: 37.1 %
HDLC SERPL-MCNC: 38 MG/DL
HGB BLD-MCNC: 12.1 G/DL
IMM GRANULOCYTES NFR BLD AUTO: 0.5 %
IRON SATN MFR SERPL: 9 %
IRON SERPL-MCNC: 23 UG/DL
LDLC SERPL CALC-MCNC: 145 MG/DL
LYMPHOCYTES # BLD AUTO: 1.36 K/UL
LYMPHOCYTES NFR BLD AUTO: 24.6 %
MAN DIFF?: NORMAL
MCHC RBC-ENTMCNC: 28.4 PG
MCHC RBC-ENTMCNC: 32.6 GM/DL
MCV RBC AUTO: 87.1 FL
MONOCYTES # BLD AUTO: 0.58 K/UL
MONOCYTES NFR BLD AUTO: 10.5 %
NEUTROPHILS # BLD AUTO: 3.42 K/UL
NEUTROPHILS NFR BLD AUTO: 62 %
NONHDLC SERPL-MCNC: 159 MG/DL
PLATELET # BLD AUTO: 272 K/UL
POTASSIUM SERPL-SCNC: 4.1 MMOL/L
PROT SERPL-MCNC: 7.1 G/DL
RBC # BLD: 4.26 M/UL
RBC # FLD: 14.6 %
SODIUM SERPL-SCNC: 137 MMOL/L
TIBC SERPL-MCNC: 259 UG/DL
TRIGL SERPL-MCNC: 76 MG/DL
TSH SERPL-ACNC: 0.46 UIU/ML
UIBC SERPL-MCNC: 237 UG/DL
VIT B12 SERPL-MCNC: 988 PG/ML
WBC # FLD AUTO: 5.52 K/UL

## 2024-10-28 ENCOUNTER — APPOINTMENT (OUTPATIENT)
Dept: FAMILY MEDICINE | Facility: CLINIC | Age: 39
End: 2024-10-28
Payer: COMMERCIAL

## 2024-10-28 VITALS
HEART RATE: 71 BPM | DIASTOLIC BLOOD PRESSURE: 80 MMHG | TEMPERATURE: 98 F | SYSTOLIC BLOOD PRESSURE: 120 MMHG | HEIGHT: 62 IN | BODY MASS INDEX: 32.2 KG/M2 | OXYGEN SATURATION: 100 % | WEIGHT: 175 LBS

## 2024-10-28 DIAGNOSIS — E66.9 OBESITY, UNSPECIFIED: ICD-10-CM

## 2024-10-28 DIAGNOSIS — D50.9 IRON DEFICIENCY ANEMIA, UNSPECIFIED: ICD-10-CM

## 2024-10-28 PROCEDURE — G2211 COMPLEX E/M VISIT ADD ON: CPT | Mod: NC

## 2024-10-28 PROCEDURE — 99214 OFFICE O/P EST MOD 30 MIN: CPT

## 2024-10-28 PROCEDURE — 36415 COLL VENOUS BLD VENIPUNCTURE: CPT

## 2024-10-28 RX ORDER — CHLORHEXIDINE GLUCONATE, 0.12% ORAL RINSE 1.2 MG/ML
0.12 SOLUTION DENTAL
Qty: 473 | Refills: 0 | Status: DISCONTINUED | COMMUNITY
Start: 2024-05-10

## 2024-10-28 RX ORDER — AMOXICILLIN 500 MG/1
500 TABLET, FILM COATED ORAL
Qty: 21 | Refills: 0 | Status: DISCONTINUED | COMMUNITY
Start: 2024-04-30

## 2024-10-28 RX ORDER — ALBUTEROL SULFATE 90 UG/1
108 (90 BASE) INHALANT RESPIRATORY (INHALATION)
Qty: 8 | Refills: 0 | Status: DISCONTINUED | COMMUNITY
Start: 2024-06-19

## 2024-10-28 RX ORDER — AMOXICILLIN 500 MG/1
500 CAPSULE ORAL
Qty: 21 | Refills: 0 | Status: DISCONTINUED | COMMUNITY
Start: 2024-05-10

## 2024-10-28 RX ORDER — ACETAMINOPHEN AND CODEINE PHOSPHATE 300; 30 MG/1; MG/1
300-30 TABLET ORAL
Qty: 15 | Refills: 0 | Status: DISCONTINUED | COMMUNITY
Start: 2024-05-01

## 2024-10-29 LAB
ALBUMIN SERPL ELPH-MCNC: 4.2 G/DL
ALP BLD-CCNC: 51 U/L
ALT SERPL-CCNC: 7 U/L
ANION GAP SERPL CALC-SCNC: 15 MMOL/L
AST SERPL-CCNC: 12 U/L
BILIRUB SERPL-MCNC: 0.6 MG/DL
BUN SERPL-MCNC: 11 MG/DL
CALCIUM SERPL-MCNC: 9.7 MG/DL
CHLORIDE SERPL-SCNC: 102 MMOL/L
CHOLEST SERPL-MCNC: 213 MG/DL
CO2 SERPL-SCNC: 22 MMOL/L
CREAT SERPL-MCNC: 0.75 MG/DL
EGFR: 103 ML/MIN/1.73M2
GLUCOSE SERPL-MCNC: 69 MG/DL
HDLC SERPL-MCNC: 65 MG/DL
IRON SATN MFR SERPL: 18 %
IRON SERPL-MCNC: 53 UG/DL
LDLC SERPL CALC-MCNC: 138 MG/DL
NONHDLC SERPL-MCNC: 148 MG/DL
POTASSIUM SERPL-SCNC: 4.6 MMOL/L
PROT SERPL-MCNC: 7 G/DL
SODIUM SERPL-SCNC: 140 MMOL/L
TIBC SERPL-MCNC: 292 UG/DL
TRIGL SERPL-MCNC: 59 MG/DL
UIBC SERPL-MCNC: 239 UG/DL

## 2024-10-30 LAB
FOLATE SERPL-MCNC: 8.2 NG/ML
VIT B12 SERPL-MCNC: 495 PG/ML

## 2025-03-24 ENCOUNTER — APPOINTMENT (OUTPATIENT)
Dept: FAMILY MEDICINE | Facility: CLINIC | Age: 40
End: 2025-03-24

## (undated) DEVICE — VENODYNE/SCD SLEEVE CALF MEDIUM

## (undated) DEVICE — RESERVOIR XTRA B BLD COLLECT

## (undated) DEVICE — TUBING BRAT 2 SUCTION ASSEMBLY TWIST LOCK

## (undated) DEVICE — SUT VICRYL 0 27" UR-6

## (undated) DEVICE — FOLEY TRAY 16FR 5CC LF UMETER CLOSED

## (undated) DEVICE — GLV 6.5 PROTEXIS (WHITE)

## (undated) DEVICE — SUT VICRYL 2-0 27" SH

## (undated) DEVICE — LAP PAD 18 X 18"

## (undated) DEVICE — SUT VICRYL 0 27" CTX

## (undated) DEVICE — SUT PLAIN GUT 2-0 27" CT-1

## (undated) DEVICE — DRSG COMBINE 5X9"

## (undated) DEVICE — SUT VICRYL 2-0 27" CT-1 UNDYED

## (undated) DEVICE — WARMING BLANKET UPPER ADULT

## (undated) DEVICE — SUT VICRYL 0 18" CT-1 UNDYED (POP-OFF)

## (undated) DEVICE — POSITIONER FOAM EGG CRATE ULNAR 2PCS (PINK)

## (undated) DEVICE — DRSG TELFA 3 X 8

## (undated) DEVICE — DRSG DERMABOND PRINEO 60CM

## (undated) DEVICE — DRSG TAPE MICROFOAM 3"

## (undated) DEVICE — DRAPE TOWEL BLUE 17" X 24"

## (undated) DEVICE — SUT VICRYL 3-0 27" SH

## (undated) DEVICE — DRAIN PENROSE .5" X 18" LATEX

## (undated) DEVICE — PACK EXPLORATORY LAPAROTOMY

## (undated) DEVICE — ELCTR COLORADO 3CM

## (undated) DEVICE — STAPLER SKIN INSORB

## (undated) DEVICE — SET BOWL XTRA 225

## (undated) DEVICE — GLV 7 PROTEXIS (WHITE)